# Patient Record
Sex: MALE | Race: WHITE | NOT HISPANIC OR LATINO | Employment: FULL TIME | URBAN - METROPOLITAN AREA
[De-identification: names, ages, dates, MRNs, and addresses within clinical notes are randomized per-mention and may not be internally consistent; named-entity substitution may affect disease eponyms.]

---

## 2023-05-10 PROBLEM — R10.9 ABDOMINAL WALL PAIN: Status: ACTIVE | Noted: 2023-05-10

## 2023-05-10 PROBLEM — D31.41 NEVUS OF IRIS OF RIGHT EYE: Status: ACTIVE | Noted: 2023-05-10

## 2023-05-10 PROBLEM — N40.0 BENIGN ENLARGEMENT OF PROSTATE: Status: ACTIVE | Noted: 2023-05-10

## 2023-05-10 PROBLEM — K21.00 GERD WITH ESOPHAGITIS: Status: ACTIVE | Noted: 2023-05-10

## 2023-05-10 PROBLEM — S83.232A COMPLEX TEAR OF MEDIAL MENISCUS OF LEFT KNEE AS CURRENT INJURY: Status: ACTIVE | Noted: 2023-05-10

## 2023-05-10 PROBLEM — L98.9 SKIN DISORDER: Status: ACTIVE | Noted: 2023-05-10

## 2023-05-10 PROBLEM — H47.239 LARGE PHYSIOLOGIC CUPPING OF OPTIC DISC: Status: ACTIVE | Noted: 2023-05-10

## 2023-05-10 PROBLEM — H02.88A MEIBOMIAN GLAND DYSFUNCTION (MGD), BILATERAL, BOTH UPPER AND LOWER LIDS: Status: ACTIVE | Noted: 2023-05-10

## 2023-05-10 PROBLEM — J01.90 ACUTE SINUSITIS: Status: ACTIVE | Noted: 2023-05-10

## 2023-05-10 PROBLEM — I10 ESSENTIAL HYPERTENSION: Status: ACTIVE | Noted: 2023-05-10

## 2023-05-10 PROBLEM — G56.20 ULNAR NEUROPATHY AT ELBOW: Status: ACTIVE | Noted: 2023-05-10

## 2023-05-10 PROBLEM — E78.5 DYSLIPIDEMIA: Status: ACTIVE | Noted: 2023-05-10

## 2023-05-10 PROBLEM — M25.569 KNEE PAIN: Status: ACTIVE | Noted: 2023-05-10

## 2023-05-10 PROBLEM — M62.830 LUMBAR PARASPINAL MUSCLE SPASM: Status: ACTIVE | Noted: 2023-05-10

## 2023-05-10 PROBLEM — G56.00 CARPAL TUNNEL SYNDROME: Status: ACTIVE | Noted: 2023-05-10

## 2023-05-10 PROBLEM — M76.52 PATELLAR TENDINITIS OF LEFT KNEE: Status: ACTIVE | Noted: 2023-05-10

## 2023-05-10 PROBLEM — J98.8 RESPIRATORY TRACT INFECTION DUE TO COVID-19 VIRUS: Status: ACTIVE | Noted: 2023-05-10

## 2023-05-10 PROBLEM — H04.301 DACROCYSTITIS, RIGHT: Status: ACTIVE | Noted: 2023-05-10

## 2023-05-10 PROBLEM — B02.9 HERPES ZOSTER: Status: ACTIVE | Noted: 2023-05-10

## 2023-05-10 PROBLEM — R31.29 MICROSCOPIC HEMATURIA: Status: ACTIVE | Noted: 2023-05-10

## 2023-05-10 PROBLEM — K44.9 HIATAL HERNIA: Status: ACTIVE | Noted: 2023-05-10

## 2023-05-10 PROBLEM — J30.81 ALLERGIC RHINITIS DUE TO CATS: Status: ACTIVE | Noted: 2023-05-10

## 2023-05-10 PROBLEM — N52.9 MALE ERECTILE DISORDER OF ORGANIC ORIGIN: Status: ACTIVE | Noted: 2023-05-10

## 2023-05-10 PROBLEM — R05.8 POST-VIRAL COUGH SYNDROME: Status: ACTIVE | Noted: 2023-05-10

## 2023-05-10 PROBLEM — N45.2 ORCHITIS, RIGHT: Status: ACTIVE | Noted: 2023-05-10

## 2023-05-10 PROBLEM — R73.03 PREDIABETES: Status: ACTIVE | Noted: 2023-05-10

## 2023-05-10 PROBLEM — R31.0 GROSS HEMATURIA: Status: ACTIVE | Noted: 2023-05-10

## 2023-05-10 PROBLEM — R35.0 URINARY FREQUENCY: Status: ACTIVE | Noted: 2023-05-10

## 2023-05-10 PROBLEM — M23.90 INTERNAL DERANGEMENT OF KNEE: Status: ACTIVE | Noted: 2023-05-10

## 2023-05-10 PROBLEM — J06.9 URI (UPPER RESPIRATORY INFECTION): Status: ACTIVE | Noted: 2023-05-10

## 2023-05-10 PROBLEM — R68.89 INFLUENZA-LIKE SYMPTOMS: Status: ACTIVE | Noted: 2023-05-10

## 2023-05-10 PROBLEM — U07.1 RESPIRATORY TRACT INFECTION DUE TO COVID-19 VIRUS: Status: ACTIVE | Noted: 2023-05-10

## 2023-05-10 PROBLEM — M72.2 PLANTAR FASCIITIS, RIGHT: Status: ACTIVE | Noted: 2023-05-10

## 2023-05-10 PROBLEM — R10.32 LEFT LOWER QUADRANT PAIN: Status: ACTIVE | Noted: 2023-05-10

## 2023-05-10 PROBLEM — J45.909 REACTIVE AIRWAY DISEASE (HHS-HCC): Status: ACTIVE | Noted: 2023-05-10

## 2023-05-10 PROBLEM — J30.89 ALLERGIC RHINITIS DUE TO HOUSE DUST MITE: Status: ACTIVE | Noted: 2023-05-10

## 2023-05-10 PROBLEM — J30.1 ALLERGIC RHINITIS DUE TO POLLEN: Status: ACTIVE | Noted: 2023-05-10

## 2023-05-10 PROBLEM — I86.1 RIGHT VARICOCELE: Status: ACTIVE | Noted: 2023-05-10

## 2023-05-10 PROBLEM — J30.9 ALLERGIC RHINITIS: Status: ACTIVE | Noted: 2023-05-10

## 2023-05-10 PROBLEM — H02.88B MEIBOMIAN GLAND DYSFUNCTION (MGD), BILATERAL, BOTH UPPER AND LOWER LIDS: Status: ACTIVE | Noted: 2023-05-10

## 2023-05-10 PROBLEM — E66.3 OVERWEIGHT: Status: ACTIVE | Noted: 2023-05-10

## 2023-05-10 PROBLEM — K21.9 ESOPHAGEAL REFLUX: Status: ACTIVE | Noted: 2023-05-10

## 2023-05-10 PROBLEM — K64.4 EXTERNAL HEMORRHOIDS: Status: ACTIVE | Noted: 2023-05-10

## 2023-05-10 PROBLEM — H93.13 TINNITUS OF BOTH EARS: Status: ACTIVE | Noted: 2023-05-10

## 2023-05-10 PROBLEM — S43.005A DISLOCATION OF SHOULDER REGION, LEFT, INITIAL ENCOUNTER: Status: ACTIVE | Noted: 2023-05-10

## 2023-05-10 PROBLEM — G47.33 OBSTRUCTIVE SLEEP APNEA ON CPAP: Status: ACTIVE | Noted: 2023-05-10

## 2023-05-10 PROBLEM — H04.129 DRY EYE SYNDROME: Status: ACTIVE | Noted: 2023-05-10

## 2023-05-10 PROBLEM — N45.1 EPIDIDYMITIS: Status: ACTIVE | Noted: 2023-05-10

## 2023-05-10 PROBLEM — R30.0 DYSURIA: Status: ACTIVE | Noted: 2023-05-10

## 2023-05-10 RX ORDER — POLYETHYLENE GLYCOL 3350 17 G/17G
POWDER, FOR SOLUTION ORAL
COMMUNITY
Start: 2014-09-24

## 2023-05-10 RX ORDER — DOXAZOSIN 4 MG/1
TABLET ORAL
COMMUNITY
End: 2023-07-07 | Stop reason: SDUPTHER

## 2023-05-10 RX ORDER — CHOLECALCIFEROL (VITAMIN D3) 25 MCG
1 TABLET ORAL DAILY
COMMUNITY
Start: 2018-02-14 | End: 2024-06-03 | Stop reason: DRUGHIGH

## 2023-05-10 RX ORDER — METHYLPREDNISOLONE 4 MG/1
TABLET ORAL
COMMUNITY
Start: 2022-03-24 | End: 2023-05-11 | Stop reason: ALTCHOICE

## 2023-05-10 RX ORDER — MULTIVITAMIN
1 TABLET ORAL DAILY
COMMUNITY
Start: 2018-02-14

## 2023-05-10 RX ORDER — SILDENAFIL 50 MG/1
TABLET, FILM COATED ORAL
COMMUNITY
Start: 2018-11-08

## 2023-05-10 RX ORDER — DOCUSATE SODIUM 100 MG/1
CAPSULE, LIQUID FILLED ORAL
COMMUNITY
Start: 2023-02-23 | End: 2023-05-11 | Stop reason: ALTCHOICE

## 2023-05-10 RX ORDER — NIRMATRELVIR AND RITONAVIR 300-100 MG
KIT ORAL
COMMUNITY
Start: 2022-05-04 | End: 2023-05-11 | Stop reason: ALTCHOICE

## 2023-05-10 RX ORDER — CIPROFLOXACIN 500 MG/1
1 TABLET ORAL EVERY 12 HOURS
COMMUNITY
Start: 2022-03-16 | End: 2023-05-11 | Stop reason: ALTCHOICE

## 2023-05-10 RX ORDER — WHEAT DEXTRIN 3 G/3.5 G
1 POWDER IN PACKET (EA) ORAL DAILY PRN
COMMUNITY
Start: 2014-09-12 | End: 2024-06-03 | Stop reason: ALTCHOICE

## 2023-05-10 RX ORDER — HYDROCODONE BITARTRATE AND ACETAMINOPHEN 5; 325 MG/1; MG/1
TABLET ORAL
COMMUNITY
Start: 2023-02-23 | End: 2023-05-11 | Stop reason: ALTCHOICE

## 2023-05-10 RX ORDER — MINERAL OIL
ENEMA (ML) RECTAL
COMMUNITY
Start: 2015-04-07

## 2023-05-10 RX ORDER — ASPIRIN 81 MG/1
TABLET ORAL
COMMUNITY
Start: 2023-02-23 | End: 2023-05-11 | Stop reason: ALTCHOICE

## 2023-05-10 RX ORDER — FLUTICASONE PROPIONATE 50 MCG
SPRAY, SUSPENSION (ML) NASAL
COMMUNITY
Start: 2015-02-26

## 2023-05-10 RX ORDER — ONDANSETRON 4 MG/1
TABLET, FILM COATED ORAL
COMMUNITY
Start: 2023-02-23 | End: 2023-05-11 | Stop reason: ALTCHOICE

## 2023-05-11 ENCOUNTER — OFFICE VISIT (OUTPATIENT)
Dept: PRIMARY CARE | Facility: CLINIC | Age: 63
End: 2023-05-11
Payer: COMMERCIAL

## 2023-05-11 VITALS
HEART RATE: 79 BPM | TEMPERATURE: 98 F | RESPIRATION RATE: 16 BRPM | DIASTOLIC BLOOD PRESSURE: 81 MMHG | SYSTOLIC BLOOD PRESSURE: 117 MMHG | BODY MASS INDEX: 37.49 KG/M2 | OXYGEN SATURATION: 96 % | WEIGHT: 268.8 LBS

## 2023-05-11 DIAGNOSIS — E78.5 DYSLIPIDEMIA, GOAL LDL BELOW 100: Chronic | ICD-10-CM

## 2023-05-11 DIAGNOSIS — R73.03 PREDIABETES: Chronic | ICD-10-CM

## 2023-05-11 DIAGNOSIS — N40.1 BENIGN PROSTATIC HYPERPLASIA WITH NOCTURIA: Chronic | ICD-10-CM

## 2023-05-11 DIAGNOSIS — K44.9 HIATAL HERNIA: Chronic | ICD-10-CM

## 2023-05-11 DIAGNOSIS — J30.1 SEASONAL ALLERGIC RHINITIS DUE TO POLLEN: Chronic | ICD-10-CM

## 2023-05-11 DIAGNOSIS — K21.00 GASTROESOPHAGEAL REFLUX DISEASE WITH ESOPHAGITIS WITHOUT HEMORRHAGE: Chronic | ICD-10-CM

## 2023-05-11 DIAGNOSIS — I10 ESSENTIAL HYPERTENSION: Chronic | ICD-10-CM

## 2023-05-11 DIAGNOSIS — E66.01 CLASS 2 SEVERE OBESITY WITH SERIOUS COMORBIDITY AND BODY MASS INDEX (BMI) OF 37.0 TO 37.9 IN ADULT, UNSPECIFIED OBESITY TYPE (MULTI): Chronic | ICD-10-CM

## 2023-05-11 DIAGNOSIS — G47.33 OBSTRUCTIVE SLEEP APNEA ON CPAP: Primary | Chronic | ICD-10-CM

## 2023-05-11 DIAGNOSIS — R35.1 BENIGN PROSTATIC HYPERPLASIA WITH NOCTURIA: Chronic | ICD-10-CM

## 2023-05-11 DIAGNOSIS — E55.9 VITAMIN D DEFICIENCY: Chronic | ICD-10-CM

## 2023-05-11 PROBLEM — J98.8 RESPIRATORY TRACT INFECTION DUE TO COVID-19 VIRUS: Status: RESOLVED | Noted: 2023-05-10 | Resolved: 2023-05-11

## 2023-05-11 PROBLEM — U07.1 RESPIRATORY TRACT INFECTION DUE TO COVID-19 VIRUS: Status: RESOLVED | Noted: 2023-05-10 | Resolved: 2023-05-11

## 2023-05-11 PROBLEM — R05.8 POST-VIRAL COUGH SYNDROME: Status: RESOLVED | Noted: 2023-05-10 | Resolved: 2023-05-11

## 2023-05-11 PROCEDURE — 3079F DIAST BP 80-89 MM HG: CPT | Performed by: INTERNAL MEDICINE

## 2023-05-11 PROCEDURE — 99214 OFFICE O/P EST MOD 30 MIN: CPT | Performed by: INTERNAL MEDICINE

## 2023-05-11 PROCEDURE — 3074F SYST BP LT 130 MM HG: CPT | Performed by: INTERNAL MEDICINE

## 2023-05-11 PROCEDURE — 3008F BODY MASS INDEX DOCD: CPT | Performed by: INTERNAL MEDICINE

## 2023-05-11 PROCEDURE — 1036F TOBACCO NON-USER: CPT | Performed by: INTERNAL MEDICINE

## 2023-05-11 ASSESSMENT — ENCOUNTER SYMPTOMS
OCCASIONAL FEELINGS OF UNSTEADINESS: 0
DEPRESSION: 0
LOSS OF SENSATION IN FEET: 0

## 2023-05-11 ASSESSMENT — PATIENT HEALTH QUESTIONNAIRE - PHQ9
1. LITTLE INTEREST OR PLEASURE IN DOING THINGS: NOT AT ALL
SUM OF ALL RESPONSES TO PHQ9 QUESTIONS 1 AND 2: 0
2. FEELING DOWN, DEPRESSED OR HOPELESS: NOT AT ALL

## 2023-05-11 NOTE — PROGRESS NOTES
Subjective   Patient ID: Marianne Huertas is a 63 y.o. male who presents for Follow-up.    Here for semiannual follow-up  For the most part doing well.  Denies illnesses or injuries  Left knee meniscal tear required arthroscopy late February.  Still some stiffness         Review of Systems    Objective   /81 (BP Location: Left arm, Patient Position: Sitting, BP Cuff Size: Adult)   Pulse 79   Temp 36.7 °C (98 °F)   Resp 16   Wt 122 kg (268 lb 12.8 oz)   SpO2 96%   BMI 37.49 kg/m²     Physical Exam  Vitals reviewed.   Constitutional:       Appearance: Normal appearance.   HENT:      Head: Normocephalic and atraumatic.   Eyes:      General: No scleral icterus.        Right eye: No discharge.         Left eye: No discharge.      Extraocular Movements: Extraocular movements intact.      Conjunctiva/sclera: Conjunctivae normal.      Pupils: Pupils are equal, round, and reactive to light.   Cardiovascular:      Rate and Rhythm: Normal rate and regular rhythm.      Pulses: Normal pulses.      Heart sounds: Normal heart sounds. No murmur heard.  Pulmonary:      Effort: Pulmonary effort is normal.      Breath sounds: Normal breath sounds. No wheezing or rhonchi.   Musculoskeletal:         General: No deformity or signs of injury. Normal range of motion.      Cervical back: Normal range of motion and neck supple. No rigidity or tenderness.   Lymphadenopathy:      Cervical: No cervical adenopathy.   Skin:     General: Skin is warm and dry.      Findings: No rash.   Neurological:      General: No focal deficit present.      Mental Status: He is alert and oriented to person, place, and time. Mental status is at baseline.      Cranial Nerves: No cranial nerve deficit.      Sensory: No sensory deficit.      Gait: Gait normal.   Psychiatric:         Mood and Affect: Mood normal.         Behavior: Behavior normal.         Thought Content: Thought content normal.         Judgment: Judgment normal.          Assessment/Plan   Problem List Items Addressed This Visit          Nervous    Obstructive sleep apnea on CPAP - Primary       Respiratory    Hiatal hernia       Circulatory    Essential hypertension    Relevant Orders    Basic Metabolic Panel    Follow Up In [facility] - PCP       Digestive    GERD with esophagitis       Genitourinary    Benign enlargement of prostate    Relevant Orders    Prostate Specific Antigen       Endocrine/Metabolic    Vitamin D deficiency (Chronic)    Relevant Orders    Vitamin D, Total    Class 2 severe obesity with serious comorbidity and body mass index (BMI) of 37.0 to 37.9 in adult (CMS/[facility], [facility]) (Chronic)    Prediabetes    Relevant Orders    Hemoglobin A1C       Other    Allergic rhinitis    Dyslipidemia, goal LDL below 100    Relevant Orders    TSH with reflex to Free T4 if abnormal    Lipid Panel    Alanine Aminotransferase          Left knee meniscal tear 10/22-requiring arthroscopy with Dr. Tello late February 2023.  Physical therapy has completed.  He still doing the exercises.  A bit stiff still.  He will advance activity and follow-up with orthopedics as scheduled.    10-year cardiac risk September 20 21- 6.4%-he understands he will likely need a statin at some point over the next several years. Fortunately calcium score = zero March 2018 We will continue to reassess annually. Weight loss would be in his best interest as well as exercise consistently     History of Atypical chest pain syndrome- September 2018- this came on when he was reaching with his left arm and resolve spontaneously after 5 hours. Evaluation in the emergency room showed no pulmonary embolus did have some pleuritic component with the pain worsening with deep inspiration. Cardiac enzymes negative. Presently he has no musculoskeletal or respiratory symptoms. Will defer further evaluation presently. No recurrence     Hypertension- blood pressure improved. He will continue weight loss efforts and  doxazosin. Blood pressure improved on recheck     Elevated weight with BMI over 30 / dyslipidemia- in the past, discussed swimming and weight loss efforts in the past. In the past, encouraged to follow up with a dietician when he is back in town to see what foods he could eat.      Exercise routine - before knee injury elliptical at home-3 times weekly-he also walks the dogs daily. Suggested he try to advance his elliptical intensity     Prediabetes- reviewed his A1c trend since 2014. Recently improved- A1c 5.9% March 2022. Reviewed information sheet on A1c, and the relation to impaired glucose metabolism. Home glucometer suggested-check his glucose at times. He will continue long-term weight loss efforts and will follow the A1c semiannually     Right lower abdominal wall discomfort-occasionally recurrent-not recently problematic     Remote right inguinal hernia repair-as an infant-no obvious hernia on past recheck.     Acid reflux/dyspepsia/hiatal hernia- EGD updated 2014- esophagitis- Prevacid helped-he is off of this presently. Certain foods cause abdominal symptoms and he tends to avoid these. Less symptoms when he does not eat is late in the evening. No swallowing discomfort encouraged him to travel with this and use this with any recurrent abdominal acid symptoms. Pepcid used as needed as well.     IBS- he'll has pain just left of the umbilical area, evaluated in GI by Dr. Espinoza in the past. long-standing abdominal bloating with certain foods Now Pepcid as needed for breakthrough symptoms in the evening. Benefiber used consistently & MiraLAX daily. In the past suggested probiotic daily.. Overall with better diet and weight loss, using his antacid far less frequently, typically just before spicy meals   With constipation he tends to have left mid abdominal discomfort which has happened recently. Encouraged ample fluid and fiber to avoid constipation- which he has been doing consistently   He will continue  colonoscopy surveillance per Dr. Espinoza-every 10 years. Updated September 2014. Next in September 2024.     Perennial allergic rhinitis/reactive airway disease/recent cough-he will continue fluticasone and Allegra daily-especially with spring pollen anticipated   Discussed upcoming spring season-he will have his medicines on hand as needed     Sleep apnea- CPAP nightly continues. no issues. He has one he travels w/ unit too     Urology care with past right orchitis / Hx epididymitis/gross hematuria/BPH/ED/urology care/family history of bladder cancer-father-recent cystoscopy and CT urogram per Dr. Shah. His PSA has increased from last year and so he will get back with Dr. Shah to review this. He's not yet seen Dr. Shah. PSA normal March 2020   Recurrent symptoms have flared-with testicular pain and dysuria. He has seen Dr. Shah, and also the urology NP in Apr '22. Improved. Awaiting annual PSA     History of umbilical hernia repair-Asymptomatic     Left knee pain - neg. ortho eval incl. x-rays per Dr. Graham; PT suggested. Mild arthritis on x-ray. There may be a meniscal injury as well. Fortunately improved presently.     Bilateral knee pain- occasional and alternating. No effusion. S/p downhill walk while in [place]. Not problematic at this time. He will call with concerns.     History of left shoulder dislocation-he saw Dr. Chichendance; surgery not needed. Asymptomatic presently.      Bilateral plantar fasciitis- occas an issue. insoles helpful     Remote left elbow ulnar transposition surgery/bilateral carpal tunnel symptoms-although an EMG did not necessarily show carpal tunnel he states, he uses wrist splints most nights which help hand tingling during the daytime. Encouraged optimal position including keeping his elbow straight at night as he has mainly fourth and fifth finger tingling symptoms more suggestive of ulnar issues        Skin care -family history of melanoma-father--he will continue to see  Dr. Martins annually; no concerns     Dental care-encouraged semiannual dental visits. Next appt in 12/22.     Dry eyes / Vision care-vision visits encouraged at least biannually. updated 2017. Reading glasses sufficient. May need an update to his spectacles for distance.         Flu shot each fall-updated mid-October 2022     Covid series completed. Three booster shots completed. Last shot on 10/31/22.     Shingrix- updated 12/19.     Follow-up semiannually or sooner as needed     Charting was completed using voice recognition technology and may include unintended errors.

## 2023-05-12 PROBLEM — E66.812 CLASS 2 SEVERE OBESITY WITH SERIOUS COMORBIDITY AND BODY MASS INDEX (BMI) OF 37.0 TO 37.9 IN ADULT: Chronic | Status: ACTIVE | Noted: 2023-05-12

## 2023-05-12 PROBLEM — E55.9 VITAMIN D DEFICIENCY: Chronic | Status: ACTIVE | Noted: 2023-05-12

## 2023-05-12 PROBLEM — E66.01 CLASS 2 SEVERE OBESITY WITH SERIOUS COMORBIDITY AND BODY MASS INDEX (BMI) OF 37.0 TO 37.9 IN ADULT (MULTI): Chronic | Status: ACTIVE | Noted: 2023-05-12

## 2023-07-07 DIAGNOSIS — N40.1 BENIGN PROSTATIC HYPERPLASIA WITH NOCTURIA: Primary | ICD-10-CM

## 2023-07-07 DIAGNOSIS — I10 ESSENTIAL HYPERTENSION: ICD-10-CM

## 2023-07-07 DIAGNOSIS — R35.1 BENIGN PROSTATIC HYPERPLASIA WITH NOCTURIA: Primary | ICD-10-CM

## 2023-07-07 RX ORDER — DOXAZOSIN 4 MG/1
4 TABLET ORAL NIGHTLY
Qty: 90 TABLET | Refills: 3 | Status: SHIPPED | OUTPATIENT
Start: 2023-07-07 | End: 2024-07-06

## 2023-10-05 ENCOUNTER — EVALUATION (OUTPATIENT)
Dept: PHYSICAL THERAPY | Facility: CLINIC | Age: 63
End: 2023-10-05
Payer: COMMERCIAL

## 2023-10-05 DIAGNOSIS — M25.562 CHRONIC PAIN OF LEFT KNEE: Primary | ICD-10-CM

## 2023-10-05 DIAGNOSIS — G89.29 CHRONIC PAIN OF LEFT KNEE: Primary | ICD-10-CM

## 2023-10-05 PROCEDURE — 97535 SELF CARE MNGMENT TRAINING: CPT | Mod: GP

## 2023-10-05 PROCEDURE — 97161 PT EVAL LOW COMPLEX 20 MIN: CPT | Mod: GP

## 2023-10-05 PROCEDURE — 97110 THERAPEUTIC EXERCISES: CPT | Mod: GP

## 2023-10-05 NOTE — Clinical Note
October 5, 2023     Patient: Marianne Huertas   YOB: 1960   Date of Visit: 10/5/2023       To Whom It May Concern:    It is my medical opinion that Marianne Huertas {Work release (duty restriction):07202}.    If you have any questions or concerns, please don't hesitate to call.         Sincerely,        Mukesh Blankenship, PT    CC: No Recipients

## 2023-10-05 NOTE — Clinical Note
October 5, 2023     Patient: Marianne Huertas   YOB: 1960   Date of Visit: 10/5/2023       To Whom it May Concern:    Marianne Huertas was seen in my clinic on 10/5/2023. He {Return to school/sport:12768}.    If you have any questions or concerns, please don't hesitate to call.         Sincerely,          Mukesh Blankenship, PT        CC: No Recipients

## 2023-10-05 NOTE — PROGRESS NOTES
Physical Therapy    Physical Therapy Evaluation and Treatment      Patient Name: Marianne Huertsa  MRN: [id number]  Today's Date: 10/5/2023  Time Calculation  Start Time: 1305  Stop Time: 1347  Time Calculation (min): 42 min      Insurance:  Visit number:  1  0% COINS,7000 DED,20 VS/YR (4 USED)     Assessment:  Pt. presented with L pain which was likely d/t pes anserinus tendonitis resulting from previous menisectomy, altered body/joint mechanics, decreased mm. strength/endurance, & abnormal neuromotor control.  Pt. is likely to benefit from skilled interventions to address their impairments, & treatment to emphasize manual techniques to decrease pain & improve joint/soft-tissue mobility, balance & gait training exercises to improve safety & functional capacity, & LE exercises to increase strength, endurance, & neuromotor control.  However, pt will attempt to manage symptoms IND x 4-6 weeks & will only f/u in the clinic prn.    Standardized testing and measures administered today reveal that the patient has multiple impairments in body structures and functions, activity limitations, and participation restrictions.  The patient has 2 personal factors and comorbidities that may serve as barriers affecting the plan of care, including large habitus, previous menisectomy.   Skilled PT services are warranted in order to realize measurable change in the above outcome measures and achieve improvements in the patient's functional status and individual goals.      Plan:  Pt to attempt IND management x 4-6 weeks & will only f/u in the clinic prn.      Current Problem:   1. Chronic pain of left knee            Subjective    Pt. is well known to me from previous PT treatment & returns today with c/o continued L knee pain.  Patient states symptoms began 2/23/23 when he had a medial menisectomy.  Pt states the strength is good & the instability is much better - it still 'jagdish' 1x/wk, but that's improved from several  times/day.    Pt had a f/u Dr. Tello who offered a gel injection, but he's not sure if he should be doing more exercises or any other treatment    Pain Intensity:  0/10 currently at rest, (10 = worst imaginable pain), 4/10 at worst.    Description:  achy  Duration:  intermittent  Status:  progressively improving     Reports symptoms are aggravated with prolonged walking, increased activity, stairs, and alleviated with rest.        Objective   Observations:  visual inspection of the L knee was unremarkable    Palpatory Exam:  pt. c/o their 'familiar' pain along the L pes anserinus tendons with mod hypertonicity noted.    Hip ROM:  B = grossly pain free + WNL    Knee ROM_____ ___R [deg]___ ___L [deg]___    Flex____________ ___127_____ ___126_____   Ext_____________ ___0_____ ___0_____    * = painful          Hip MMT______ ___R______ ___L_____   Flex____________ ___5/5____ ___5/5___   ADD___________ ___5/5____ ___4/5*___   ABD___________  ___5/5____ ___5/5___   Ext_____________ ___5/5____ ___5/5___   * = painful          Knee MMT_____ ___R______ ___L_____   Flex____________ ___5/5____ ___5/5___   Ext_____________ ___5/5____ ___5/5___   * = painful          Knee Tests_________ ___R_____ ___L_____    Valgus at 0 deg____ ___[-]_____ ___[-]_____   Valgus at 30 deg___ ___[-]_____ ___[-]_____   Varus at 0 deg_____ ___[-]_____ ___[-]_____   Varus at 30 deg____ ___[-]_____ ___[-]_____   Jt. Line Tenderness_ ___[-]_____ ___[-]_____   Lynsey's________ ___[-]_____ ___[-]_____   Apley's___________ ___[-]_____ ___[-]_____         Treatment today included:    PT Evaluation (53269):  17 minutes      Therapeutic Exercises (00316):  15 minutes    Access Code: [email or web address]  URL: [email or web address]  Date: 10/05/2023  Prepared by: Mukesh Blankenship    Exercises  - Standing Repeated Hip Adduction with Resistance  - 4-5 x weekly - 2-3 sets - 10-15 reps  - Hip and Knee Flexion with Anchored Resistance  - 4-5 x weekly - 2-3  sets - 10-15 reps  - Walking / Pool / Cardio    * = added to HEP.  Sheet with exercise descriptions and images issued.  Skilled intervention utilized in the appropriate selection & application of above exercises.  Verbal and tactile cues provided for proper form and technique.  Pt. demonstrated appropriate form & verbalized understanding of optimal technique for above exercises.        Self Care / Home Management (63770):  10 minutes  The patient was educated on:  the importance of positioning, proper posture, and body mechanics, joint mechanics and pathology, general tissue healing time, the appropriate use of heat and cold to control pain and inflammation, the importance of general therapeutic exercise, especially to stay within pain-free ROM, specific anatomy, function, & regional interdependence of involved areas, & likely cause of impairments & POC.  Pt's questions were answered to their satisfaction, & pt. verbalized understanding & agreement with POC.      Goals:  Demonstrate independence with home exercise program  Increase L knee strength to pain free + WNL  Report decrease in pain by greater than or equal to 2 points to meet the MCID  Perform ADLs without an increase symptoms  Ascend / descend stairs without an increase in symptoms  Ambulate x 30 min without an increase in symptoms  Pt. will be able to sleep through the night without an increase in symptoms

## 2023-11-13 ENCOUNTER — LAB (OUTPATIENT)
Dept: LAB | Facility: LAB | Age: 63
End: 2023-11-13
Payer: COMMERCIAL

## 2023-11-13 DIAGNOSIS — R73.03 PREDIABETES: Chronic | ICD-10-CM

## 2023-11-13 DIAGNOSIS — E55.9 VITAMIN D DEFICIENCY: Chronic | ICD-10-CM

## 2023-11-13 DIAGNOSIS — N40.1 BENIGN PROSTATIC HYPERPLASIA WITH NOCTURIA: Chronic | ICD-10-CM

## 2023-11-13 DIAGNOSIS — I10 ESSENTIAL HYPERTENSION: Chronic | ICD-10-CM

## 2023-11-13 DIAGNOSIS — R35.1 BENIGN PROSTATIC HYPERPLASIA WITH NOCTURIA: Chronic | ICD-10-CM

## 2023-11-13 DIAGNOSIS — E78.5 DYSLIPIDEMIA, GOAL LDL BELOW 100: Chronic | ICD-10-CM

## 2023-11-13 LAB
25(OH)D3 SERPL-MCNC: 23 NG/ML (ref 30–100)
ALT SERPL W P-5'-P-CCNC: 21 U/L (ref 10–52)
ANION GAP SERPL CALC-SCNC: 11 MMOL/L (ref 10–20)
BUN SERPL-MCNC: 22 MG/DL (ref 6–23)
CALCIUM SERPL-MCNC: 9.2 MG/DL (ref 8.6–10.3)
CHLORIDE SERPL-SCNC: 104 MMOL/L (ref 98–107)
CHOLEST SERPL-MCNC: 184 MG/DL (ref 0–199)
CHOLESTEROL/HDL RATIO: 3
CO2 SERPL-SCNC: 25 MMOL/L (ref 21–32)
CREAT SERPL-MCNC: 1.01 MG/DL (ref 0.5–1.3)
EST. AVERAGE GLUCOSE BLD GHB EST-MCNC: 126 MG/DL
GFR SERPL CREATININE-BSD FRML MDRD: 85 ML/MIN/1.73M*2
GLUCOSE SERPL-MCNC: 92 MG/DL (ref 74–99)
HBA1C MFR BLD: 6 %
HDLC SERPL-MCNC: 62 MG/DL
LDLC SERPL CALC-MCNC: 95 MG/DL
NON HDL CHOLESTEROL: 122 MG/DL (ref 0–149)
POTASSIUM SERPL-SCNC: 3.9 MMOL/L (ref 3.5–5.3)
PSA SERPL-MCNC: 2.73 NG/ML
SODIUM SERPL-SCNC: 136 MMOL/L (ref 136–145)
TRIGL SERPL-MCNC: 137 MG/DL (ref 0–149)
TSH SERPL-ACNC: 2.12 MIU/L (ref 0.44–3.98)
VLDL: 27 MG/DL (ref 0–40)

## 2023-11-13 PROCEDURE — 82306 VITAMIN D 25 HYDROXY: CPT

## 2023-11-13 PROCEDURE — 36415 COLL VENOUS BLD VENIPUNCTURE: CPT

## 2023-11-13 PROCEDURE — 80048 BASIC METABOLIC PNL TOTAL CA: CPT

## 2023-11-13 PROCEDURE — 83036 HEMOGLOBIN GLYCOSYLATED A1C: CPT

## 2023-11-13 PROCEDURE — 80061 LIPID PANEL: CPT

## 2023-11-13 PROCEDURE — 84443 ASSAY THYROID STIM HORMONE: CPT

## 2023-11-13 PROCEDURE — 84153 ASSAY OF PSA TOTAL: CPT

## 2023-11-13 PROCEDURE — 84460 ALANINE AMINO (ALT) (SGPT): CPT

## 2023-11-14 ENCOUNTER — OFFICE VISIT (OUTPATIENT)
Dept: PRIMARY CARE | Facility: CLINIC | Age: 63
End: 2023-11-14
Payer: COMMERCIAL

## 2023-11-14 VITALS
BODY MASS INDEX: 39.51 KG/M2 | SYSTOLIC BLOOD PRESSURE: 130 MMHG | DIASTOLIC BLOOD PRESSURE: 88 MMHG | HEIGHT: 70 IN | HEART RATE: 70 BPM | TEMPERATURE: 97.3 F | WEIGHT: 276 LBS | OXYGEN SATURATION: 97 % | RESPIRATION RATE: 16 BRPM

## 2023-11-14 DIAGNOSIS — E55.9 VITAMIN D DEFICIENCY: Chronic | ICD-10-CM

## 2023-11-14 DIAGNOSIS — Z12.11 COLON CANCER SCREENING: Primary | Chronic | ICD-10-CM

## 2023-11-14 DIAGNOSIS — E66.01 CLASS 2 SEVERE OBESITY WITH SERIOUS COMORBIDITY AND BODY MASS INDEX (BMI) OF 39.0 TO 39.9 IN ADULT, UNSPECIFIED OBESITY TYPE (MULTI): Chronic | ICD-10-CM

## 2023-11-14 DIAGNOSIS — N45.1 EPIDIDYMITIS: Chronic | ICD-10-CM

## 2023-11-14 DIAGNOSIS — R73.03 PREDIABETES: Chronic | ICD-10-CM

## 2023-11-14 DIAGNOSIS — I10 ESSENTIAL HYPERTENSION: Chronic | ICD-10-CM

## 2023-11-14 PROCEDURE — 3075F SYST BP GE 130 - 139MM HG: CPT | Performed by: INTERNAL MEDICINE

## 2023-11-14 PROCEDURE — 1036F TOBACCO NON-USER: CPT | Performed by: INTERNAL MEDICINE

## 2023-11-14 PROCEDURE — 99396 PREV VISIT EST AGE 40-64: CPT | Performed by: INTERNAL MEDICINE

## 2023-11-14 PROCEDURE — 3079F DIAST BP 80-89 MM HG: CPT | Performed by: INTERNAL MEDICINE

## 2023-11-14 PROCEDURE — 93000 ELECTROCARDIOGRAM COMPLETE: CPT | Performed by: INTERNAL MEDICINE

## 2023-11-14 PROCEDURE — 3008F BODY MASS INDEX DOCD: CPT | Performed by: INTERNAL MEDICINE

## 2023-11-14 RX ORDER — LEVOFLOXACIN 500 MG/1
500 TABLET, FILM COATED ORAL DAILY
Qty: 3 TABLET | Refills: 0 | Status: SHIPPED | OUTPATIENT
Start: 2023-11-14 | End: 2023-11-17

## 2023-11-14 RX ORDER — PANTOPRAZOLE SODIUM 40 MG/1
40 TABLET, DELAYED RELEASE ORAL DAILY PRN
COMMUNITY
Start: 2015-12-14

## 2023-11-14 ASSESSMENT — ENCOUNTER SYMPTOMS
DEPRESSION: 0
OCCASIONAL FEELINGS OF UNSTEADINESS: 0
LOSS OF SENSATION IN FEET: 0

## 2023-11-14 ASSESSMENT — PATIENT HEALTH QUESTIONNAIRE - PHQ9
SUM OF ALL RESPONSES TO PHQ9 QUESTIONS 1 AND 2: 0
2. FEELING DOWN, DEPRESSED OR HOPELESS: NOT AT ALL
1. LITTLE INTEREST OR PLEASURE IN DOING THINGS: NOT AT ALL

## 2023-11-14 NOTE — PROGRESS NOTES
"Subjective   Patient ID: Marianne Huertas is a 63 y.o. male who presents for Annual Exam.    Here for follow-up and wellness visit.  He was recently in urgent care for left testicular pain diagnosed as epididymitis.  7 days of levofloxacin 500 mg daily helped though he has some residual symptoms.  He is coincidentally scheduled to see his urologist Dr. Shah in 3 days this Friday    No exertional chest pain, palpitations, dizziness, orthopnea or pedal edema.    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    No recent illnesses         Review of Systems    Objective   /88   Pulse 70   Temp 36.3 °C (97.3 °F)   Resp 16   Ht 1.778 m (5' 10\")   Wt 125 kg (276 lb)   SpO2 97%   BMI 39.60 kg/m²     Physical Exam  Constitutional:       Appearance: Normal appearance.   HENT:      Head: Normocephalic and atraumatic.      Right Ear: Tympanic membrane normal.      Left Ear: Tympanic membrane normal.      Nose: Nose normal.   Eyes:      General: No scleral icterus.     Extraocular Movements: Extraocular movements intact.      Conjunctiva/sclera: Conjunctivae normal.      Pupils: Pupils are equal, round, and reactive to light.   Cardiovascular:      Rate and Rhythm: Normal rate and regular rhythm.      Pulses: Normal pulses.      Heart sounds: Normal heart sounds. No murmur heard.  Pulmonary:      Effort: Pulmonary effort is normal. No respiratory distress.      Breath sounds: Normal breath sounds. No stridor. No wheezing.   Abdominal:      General: Abdomen is flat. Bowel sounds are normal. There is no distension.      Palpations: Abdomen is soft. There is no mass.      Tenderness: There is no abdominal tenderness. There is no guarding.   Musculoskeletal:         General: No swelling, tenderness or deformity. Normal range of motion.      Cervical back: Normal range of motion and neck supple. No tenderness.   Lymphadenopathy:      Cervical: No cervical adenopathy.   Skin:     " General: Skin is warm and dry.      Findings: No lesion or rash.   Neurological:      General: No focal deficit present.      Mental Status: He is alert and oriented to person, place, and time.      Cranial Nerves: No cranial nerve deficit.      Motor: No weakness.   Psychiatric:         Mood and Affect: Mood normal.         Behavior: Behavior normal.         Thought Content: Thought content normal.         Judgment: Judgment normal.         Assessment/Plan   Problem List Items Addressed This Visit             ICD-10-CM    Epididymitis N45.1    Relevant Medications    levoFLOXacin (Levaquin) 500 mg tablet    Essential hypertension I10    Relevant Orders    ECG 12 lead (Clinic Performed) (Completed)    Prediabetes R73.03    Relevant Orders    Hemoglobin A1C    Basic Metabolic Panel    Vitamin D deficiency (Chronic) E55.9    Relevant Orders    Vitamin D 25-Hydroxy,Total (for eval of Vitamin D levels)    Class 2 severe obesity with serious comorbidity and body mass index (BMI) of 39.0 to 39.9 in adult (CMS/[facility]) E66.01, Z68.39    Colon cancer screening - Primary (Chronic) Z12.11    Relevant Orders    Colonoscopy Screening; Average Risk Patient        Left knee meniscal tear 10/22-requiring arthroscopy with Dr. Tello late February 2023.  Physical therapy has completed.  He still doing the exercises.  A bit stiff still.  He will advance activity and follow-up with orthopedics as scheduled.             He did [place] back with physical therapy 10/23.  He will continue stretching though he notes is not quite right.  He may consider a gel injection he notes at some point soon    10-year cardiac risk September 20 21- 6.4%-he understands he will likely need a statin at some point over the next several years. Fortunately calcium score = zero March 2018 We will continue to reassess annually. Weight loss would be in his best interest as well as exercise consistently     History of Atypical chest pain syndrome- September 2018- this  came on when he was reaching with his left arm and resolve spontaneously after 5 hours. Evaluation in the emergency room showed no pulmonary embolus did have some pleuritic component with the pain worsening with deep inspiration. Cardiac enzymes negative. Presently he has no musculoskeletal or respiratory symptoms. Will defer further evaluation presently. No recurrence     Hypertension- blood pressure improved. He will continue weight loss efforts and doxazosin. Blood pressure improved on recheck     Elevated weight with BMI over 30 / dyslipidemia- in the past, discussed swimming and weight loss efforts in the past. In the past, encouraged to follow up with a dietician when he is back in town to see what foods he could eat.                11/23-BMI 39.6.  He will continue his diet and exercise efforts     Exercise routine - before knee injury elliptical at home-3 times weekly-he also walks the dogs daily. Suggested he try to advance his elliptical intensity          He plans to resume his shorter walks and elliptical workouts this winter     Prediabetes- reviewed his A1c trend since 2014. Recently improved- A1c 5.9% March 2022. Reviewed information sheet on A1c, and the relation to impaired glucose metabolism. Home glucometer suggested-check his glucose at times. He will continue long-term weight loss efforts and will follow the A1c semiannually             11/23-A1c stable at 6%.  We will follow semiannually     Right lower abdominal wall discomfort-occasionally recurrent-not recently problematic     Remote right inguinal hernia repair-as an infant-no obvious hernia on past recheck.     Acid reflux/dyspepsia/hiatal hernia- EGD updated 2014- esophagitis- Prevacid helped-he is off of this presently. Certain foods cause abdominal symptoms and he tends to avoid these. Less symptoms when he does not eat is late in the evening. No swallowing discomfort encouraged him to travel with this and use this with any recurrent  abdominal acid symptoms. Pepcid used as needed as well.     IBS- he'll has pain just left of the umbilical area, evaluated in GI by Dr. Espinoza in the past. long-standing abdominal bloating with certain foods Now Pepcid as needed for breakthrough symptoms in the evening. Benefiber used consistently & MiraLAX daily. In the past suggested probiotic daily.. Overall with better diet and weight loss, using his antacid far less frequently, typically just before spicy meals   With constipation he tends to have left mid abdominal discomfort which has happened recently. Encouraged ample fluid and fiber to avoid constipation- which he has been doing consistently   He will continue colonoscopy surveillance per Dr. Espinoza-every 10 years. Updated September 2014. Next in September 2024.                          11/23-colonoscopy ordered       Perennial allergic rhinitis/reactive airway disease/recent cough-he will continue fluticasone and Allegra daily-especially with spring pollen anticipated   Discussed upcoming spring season-he will have his medicines on hand as needed     Sleep apnea- CPAP nightly continues. no issues. He has one he travels w/ unit too     Urology care with past right orchitis / Hx epididymitis/gross hematuria/BPH/ED/urology care/family history of bladder cancer-father-recent cystoscopy and CT urogram per Dr. Shah.             11/23-PSA stable.  He recently had a bout of left testicular pain diagnosed as epididymitis in urgent care.  He received 7 days of Levaquin-still some residual symptoms.  He will take 3 more days of Levaquin and discussed with Dr. Shah who he coincidentally sees later this week.       History of umbilical hernia repair-Asymptomatic     Left knee pain - neg. ortho eval incl. x-rays per Dr. Graham; PT suggested. Mild arthritis on x-ray. There may be a meniscal injury as well. Fortunately improved presently.     Bilateral knee pain- occasional and alternating. No effusion. S/p downhill  walk while in [place]. Not problematic at this time. He will call with concerns.     History of left shoulder dislocation-he saw Dr. Chichendance; surgery not needed. Asymptomatic presently.      Bilateral plantar fasciitis- occas an issue. insoles helpful     Remote left elbow ulnar transposition surgery/bilateral carpal tunnel symptoms-although an EMG did not necessarily show carpal tunnel he states, he uses wrist splints most nights which help hand tingling during the daytime. Encouraged optimal position including keeping his elbow straight at night as he has mainly fourth and fifth finger tingling symptoms more suggestive of ulnar issues        Skin care -family history of melanoma-father--he will continue to see Dr. Martins annually; no concerns     Dental care-encouraged semiannual dental visits. Next appt in 12/22.     Dry eyes / Vision care-vision visits encouraged at least biannually. updated 2017. Reading glasses sufficient. May need an update to his spectacles for distance.         Flu shot each fall-updated mid-October 2022     Covid series completed. Three booster shots completed. Last shot on 10/31/22.     Shingrix- updated 12/19.     Follow-up semiannually or sooner as needed     Charting was completed using voice recognition technology and may include unintended errors.

## 2023-11-15 PROBLEM — E66.01 CLASS 2 SEVERE OBESITY WITH SERIOUS COMORBIDITY AND BODY MASS INDEX (BMI) OF 39.0 TO 39.9 IN ADULT (MULTI): Status: ACTIVE | Noted: 2023-05-12

## 2023-11-15 PROBLEM — Z12.11 COLON CANCER SCREENING: Chronic | Status: ACTIVE | Noted: 2023-11-15

## 2023-11-15 PROBLEM — E66.812 CLASS 2 SEVERE OBESITY WITH SERIOUS COMORBIDITY AND BODY MASS INDEX (BMI) OF 39.0 TO 39.9 IN ADULT: Status: ACTIVE | Noted: 2023-05-12

## 2023-11-15 NOTE — PROGRESS NOTES
FUV    Last seen - 3/24/22     HISTORY OF PRESENT ILLNESS:   Marianne Huertas is a 63 y.o. male who is being seen today for follow up and testicular discomfort.    PAST MEDICAL HISTORY:  Past Medical History:   Diagnosis Date    Body mass index (BMI) 36.0-36.9, adult 09/15/2018    BMI 36.0-36.9,adult    Chest pain, unspecified 11/09/2018    Chest pain syndrome    Cough, unspecified 01/10/2014    Cough    Elevated blood-pressure reading, without diagnosis of hypertension 07/21/2015    Elevated blood pressure reading without diagnosis of hypertension    Fracture of left shoulder girdle, part unspecified, initial encounter for closed fracture 02/28/2015    Fracture of bone of shoulder, left, closed, initial encounter    Male erectile dysfunction, unspecified 11/08/2018    Male erectile disorder of organic origin    Obstructive sleep apnea (adult) (pediatric) 03/16/2019    Obstructive sleep apnea on CPAP    Other abnormal glucose 09/27/2021    Hemoglobin A1c above reference range    Other general symptoms and signs 03/21/2016    Flu-like symptoms    Pain in left shoulder 12/01/2014    Left shoulder pain    Pain in right shoulder 12/01/2014    Right shoulder pain    Personal history of other diseases of male genital organs 03/31/2022    History of epididymitis    Personal history of other diseases of the digestive system 07/21/2015    History of constipation    Personal history of other diseases of the digestive system 10/21/2015    History of umbilical hernia    Personal history of other diseases of the nervous system and sense organs 02/28/2015    History of carpal tunnel syndrome    Personal history of other diseases of the nervous system and sense organs 08/19/2014    History of otitis media    Personal history of other diseases of the nervous system and sense organs 03/16/2019    History of sleep apnea    Personal history of other diseases of the respiratory system 09/13/2014    Personal history of asthma     Personal history of other diseases of the respiratory system 03/21/2016    History of acute sinusitis    Personal history of other endocrine, nutritional and metabolic disease 01/18/2018    History of obesity    Personal history of other specified conditions 07/30/2020    History of gross hematuria    Personal history of other specified conditions 02/16/2018    History of snoring    Personal history of other specified conditions 01/09/2014    History of headache    Umbilical hernia with obstruction, without gangrene 08/19/2015    Incarcerated umbilical hernia    Unspecified abdominal pain 07/21/2015    Abdominal cramping    Unspecified abdominal pain 09/27/2021    Abdominal wall pain    Unspecified abdominal pain 09/27/2021    Abdominal wall pain    Unspecified dacryocystitis of right lacrimal passage 02/13/2016    Dacrocystitis, right   - Renal cysts   - Epididymitis  - Gross hematuria with negative workup 03/19   - BPH   - ED     PAST SURGICAL HISTORY:  Past Surgical History:   Procedure Laterality Date    COLONOSCOPY  12/13/2013    Complete Colonoscopy    CYSTOSCOPY  12/13/2013    Diagnostic Cystoscopy    ESOPHAGOGASTRODUODENOSCOPY  05/23/2017    Diagnostic Esophagogastroduodenoscopy    KNEE ARTHROSCOPY W/ MENISCAL REPAIR      OTHER SURGICAL HISTORY  12/13/2013    Neuroplasty With Transposition Of Ulnar Nerve - At Elbow    OTHER SURGICAL HISTORY  05/24/2021    Hernia Repair Inguinal Unilateral    UMBILICAL HERNIA REPAIR  11/17/2015    Umbilical Hernia Repair        ALLERGIES:   Allergies   Allergen Reactions    Cat Dander Unknown    House Dust Mite Other        MEDICATIONS:   Current Outpatient Medications   Medication Instructions    Bacillus coagulans (PROBIOTIC, B. COAGULANS, ORAL) 1 capsule, oral, Daily    cholecalciferol (Vitamin D-3) 25 MCG (1000 UT) tablet 1 tablet, oral, Daily    doxazosin (CARDURA) 4 mg, oral, Nightly    fexofenadine (Allegra) 180 mg tablet oral    fluticasone (Flonase) 50 mcg/actuation  "nasal spray nasal    levoFLOXacin (LEVAQUIN) 500 mg, oral, Daily    multivitamin tablet 1 tablet, oral, Daily    pantoprazole (ProtoNix) 40 mg EC tablet     polyethylene glycol (Glycolax) 17 gram/dose powder oral    sildenafil (Viagra) 50 mg tablet oral    wheat dextrin (Benefiber Clear SF, dextrin,) 3 gram/3.5 gram powder in packet oral        PHYSICAL EXAM:  There were no vitals taken for this visit.  Constitutional: Patient appears well-developed and well-nourished. No distress.    Pulmonary/Chest: Effort normal. No respiratory distress.   Abdominal: Soft, ND NT  : WNL  Musculoskeletal: Normal range of motion.    Neurological: Alert and oriented to person, place, and time.  Psychiatric: Normal mood and affect. Behavior is normal. Thought content normal.      Labs:  No results found for: \"TESTOSTERONE\"  Lab Results   Component Value Date    PSA 2.73 11/13/2023     No components found for: \"CBC\"  Lab Results   Component Value Date    CREATININE 1.01 11/13/2023     No components found for: \"TESTOTMS\"  No results found for: \"TESTF\"    Discussion:  Recently had epididymitis to left testicle, had a prior episode in 2018 and recently had similar feeling. Visited with urgent care and was given abx 7 day course and a another 3 day course from PCP. He states his sxs felt better but has not resolved. He has been taking Aleve, I recommended for him to try ibuprofen for discomfort. Also, to have good scrotal support.    AUA 7  NURIA 11    Assessment:    No diagnosis found.    Marianne Huertas is a 63 y.o. male here for FUV     Plan:   1) Will plan for scrotal US and follow up after to review results.   2) All questions and concerns were addressed. Patient verbalizes understanding and has no other questions at this time.     Scribe Attestation  By signing my name below, I, Robbin Powers   attest that this documentation has been prepared under the direction and in the presence of Ishan Shah MD.    "

## 2023-11-16 ENCOUNTER — OFFICE VISIT (OUTPATIENT)
Dept: UROLOGY | Facility: HOSPITAL | Age: 63
End: 2023-11-16
Payer: COMMERCIAL

## 2023-11-16 DIAGNOSIS — N40.1 BENIGN LOCALIZED PROSTATIC HYPERPLASIA WITH LOWER URINARY TRACT SYMPTOMS (LUTS): Primary | ICD-10-CM

## 2023-11-16 DIAGNOSIS — N45.1 EPIDIDYMITIS WITHOUT ABSCESS: ICD-10-CM

## 2023-11-16 PROCEDURE — 3008F BODY MASS INDEX DOCD: CPT | Performed by: UROLOGY

## 2023-11-16 PROCEDURE — 99213 OFFICE O/P EST LOW 20 MIN: CPT | Performed by: UROLOGY

## 2023-11-16 PROCEDURE — 1036F TOBACCO NON-USER: CPT | Performed by: UROLOGY

## 2023-11-17 ENCOUNTER — ANCILLARY PROCEDURE (OUTPATIENT)
Dept: RADIOLOGY | Facility: CLINIC | Age: 63
End: 2023-11-17
Payer: COMMERCIAL

## 2023-11-17 DIAGNOSIS — N45.1 EPIDIDYMITIS WITHOUT ABSCESS: ICD-10-CM

## 2023-11-17 PROCEDURE — 76870 US EXAM SCROTUM: CPT | Performed by: RADIOLOGY

## 2023-11-17 PROCEDURE — 93975 VASCULAR STUDY: CPT

## 2023-12-04 ENCOUNTER — ANESTHESIA EVENT (OUTPATIENT)
Dept: GASTROENTEROLOGY | Facility: EXTERNAL LOCATION | Age: 63
End: 2023-12-04

## 2023-12-05 ENCOUNTER — HOSPITAL ENCOUNTER (OUTPATIENT)
Dept: GASTROENTEROLOGY | Facility: EXTERNAL LOCATION | Age: 63
Discharge: HOME | End: 2023-12-05
Payer: COMMERCIAL

## 2023-12-05 ENCOUNTER — ANESTHESIA (OUTPATIENT)
Dept: GASTROENTEROLOGY | Facility: EXTERNAL LOCATION | Age: 63
End: 2023-12-05

## 2023-12-05 VITALS
SYSTOLIC BLOOD PRESSURE: 125 MMHG | TEMPERATURE: 96.8 F | RESPIRATION RATE: 18 BRPM | DIASTOLIC BLOOD PRESSURE: 85 MMHG | OXYGEN SATURATION: 96 % | HEART RATE: 70 BPM | BODY MASS INDEX: 38.65 KG/M2 | WEIGHT: 270 LBS | HEIGHT: 70 IN

## 2023-12-05 DIAGNOSIS — Z12.11 COLON CANCER SCREENING: ICD-10-CM

## 2023-12-05 PROCEDURE — 88305 TISSUE EXAM BY PATHOLOGIST: CPT

## 2023-12-05 PROCEDURE — 0753T DGTZ GLS MCRSCP SLD LEVEL IV: CPT

## 2023-12-05 PROCEDURE — 88305 TISSUE EXAM BY PATHOLOGIST: CPT | Performed by: PATHOLOGY

## 2023-12-05 PROCEDURE — 45385 COLONOSCOPY W/LESION REMOVAL: CPT | Performed by: STUDENT IN AN ORGANIZED HEALTH CARE EDUCATION/TRAINING PROGRAM

## 2023-12-05 RX ORDER — PROPOFOL 10 MG/ML
INJECTION, EMULSION INTRAVENOUS AS NEEDED
Status: DISCONTINUED | OUTPATIENT
Start: 2023-12-05 | End: 2023-12-05

## 2023-12-05 RX ORDER — LIDOCAINE HYDROCHLORIDE 20 MG/ML
INJECTION, SOLUTION EPIDURAL; INFILTRATION; INTRACAUDAL; PERINEURAL AS NEEDED
Status: DISCONTINUED | OUTPATIENT
Start: 2023-12-05 | End: 2023-12-05

## 2023-12-05 RX ORDER — SODIUM CHLORIDE 9 MG/ML
20 INJECTION, SOLUTION INTRAVENOUS CONTINUOUS
Status: DISCONTINUED | OUTPATIENT
Start: 2023-12-05 | End: 2023-12-06 | Stop reason: HOSPADM

## 2023-12-05 RX ADMIN — PROPOFOL 100 MG: 10 INJECTION, EMULSION INTRAVENOUS at 14:25

## 2023-12-05 RX ADMIN — PROPOFOL 50 MG: 10 INJECTION, EMULSION INTRAVENOUS at 14:34

## 2023-12-05 RX ADMIN — PROPOFOL 50 MG: 10 INJECTION, EMULSION INTRAVENOUS at 14:43

## 2023-12-05 RX ADMIN — LIDOCAINE HYDROCHLORIDE 50 MG: 20 INJECTION, SOLUTION EPIDURAL; INFILTRATION; INTRACAUDAL; PERINEURAL at 14:25

## 2023-12-05 RX ADMIN — SODIUM CHLORIDE: 9 INJECTION, SOLUTION INTRAVENOUS at 14:26

## 2023-12-05 RX ADMIN — PROPOFOL 50 MG: 10 INJECTION, EMULSION INTRAVENOUS at 14:36

## 2023-12-05 RX ADMIN — PROPOFOL 50 MG: 10 INJECTION, EMULSION INTRAVENOUS at 14:39

## 2023-12-05 RX ADMIN — PROPOFOL 50 MG: 10 INJECTION, EMULSION INTRAVENOUS at 14:31

## 2023-12-05 SDOH — HEALTH STABILITY: MENTAL HEALTH: CURRENT SMOKER: 0

## 2023-12-05 ASSESSMENT — PAIN SCALES - GENERAL
PAINLEVEL_OUTOF10: 0 - NO PAIN
PAINLEVEL_OUTOF10: 0 - NO PAIN
PAIN_LEVEL: 0
PAINLEVEL_OUTOF10: 0 - NO PAIN
PAINLEVEL_OUTOF10: 0 - NO PAIN

## 2023-12-05 ASSESSMENT — COLUMBIA-SUICIDE SEVERITY RATING SCALE - C-SSRS
1. IN THE PAST MONTH, HAVE YOU WISHED YOU WERE DEAD OR WISHED YOU COULD GO TO SLEEP AND NOT WAKE UP?: NO
2. HAVE YOU ACTUALLY HAD ANY THOUGHTS OF KILLING YOURSELF?: NO
6. HAVE YOU EVER DONE ANYTHING, STARTED TO DO ANYTHING, OR PREPARED TO DO ANYTHING TO END YOUR LIFE?: NO

## 2023-12-05 ASSESSMENT — PAIN - FUNCTIONAL ASSESSMENT
PAIN_FUNCTIONAL_ASSESSMENT: 0-10

## 2023-12-05 NOTE — ANESTHESIA POSTPROCEDURE EVALUATION
Patient: Marianne Huertas    Procedure Summary       Date: 12/05/23 Room / Location: [facility]    Anesthesia Start: 1425 Anesthesia Stop:     Procedure: COLONOSCOPY Diagnosis: Colon cancer screening    Scheduled Providers: OBDULIA Cerrato RN Responsible Provider: MARLIN Nevarez    Anesthesia Type: MAC ASA Status: 3            Anesthesia Type: MAC    Vitals Value Taken Time   /75 12/05/23 1450   Temp 36 °C (96.8 °F) 12/05/23 1450   Pulse 73 12/05/23 1450   Resp 12 12/05/23 1450   SpO2 96 % 12/05/23 1450       Anesthesia Post Evaluation    Patient location during evaluation: bedside  Patient participation: complete - patient cannot participate  Level of consciousness: awake and responsive to verbal stimuli  Pain score: 0  Pain management: adequate  Airway patency: patent  Cardiovascular status: acceptable and hemodynamically stable  Respiratory status: acceptable  Hydration status: acceptable  Postoperative Nausea and Vomiting: none        No notable events documented.

## 2023-12-05 NOTE — ANESTHESIA PREPROCEDURE EVALUATION
Patient: Marianne Huertas    Procedure Information       Date/Time: 12/05/23 1430    Scheduled providers: Jaison Drake DO; Mer Mcelroy RN    Procedure: COLONOSCOPY    Location: [facility]            Relevant Problems   Cardiovascular   (+) Dyslipidemia, goal LDL below 100   (+) Essential hypertension      Endocrine   (+) Class 2 severe obesity with serious comorbidity and body mass index (BMI) of 39.0 to 39.9 in adult (CMS/[facility])      GI   (+) Esophageal reflux   (+) Hiatal hernia      Neuro/Psych   (+) CTS (carpal tunnel syndrome)   (+) Carpal tunnel syndrome   (+) Ulnar neuropathy at elbow      Pulmonary   (+) Obstructive sleep apnea on CPAP      Musculoskeletal   (+) CTS (carpal tunnel syndrome)   (+) Carpal tunnel syndrome      Infectious Disease   (+) Herpes zoster   (+) URI (upper respiratory infection)      Other   (+) Patellar tendinitis of left knee       Clinical information reviewed:    Allergies                NPO Detail:  No data recorded     Physical Exam    Airway  Mallampati: III  TM distance: >3 FB  Neck ROM: full     Cardiovascular - normal exam     Dental - normal exam     Pulmonary - normal exam  Breath sounds clear to auscultation     Abdominal            Anesthesia Plan    ASA 3     MAC     The patient is not a current smoker.    intravenous induction   Anesthetic plan and risks discussed with patient.  Use of blood products discussed with patient who.    Plan discussed with CRNA.

## 2023-12-05 NOTE — DISCHARGE INSTRUCTIONS
Patient Instructions Post Procedure      The anesthetics, sedatives or narcotics which were given to you today will be acting in your body for the next 24 hours, so you might feel a little sleepy or groggy.  This feeling should slowly wear off. Carefully read and follow the instructions.     You received sedation today:  - Do not drive or operate any machinery or power tools of any kind.   - No alcoholic beverages today, not even beer or wine.  - Do not make any important decisions or sign any legal documents.  - No over the counter medications that contain alcohol or that may cause drowsiness.    While it is common to experience mild to moderate abdominal distention, gas, or belching after your procedure, if any of these symptoms occur following discharge from the GI Lab or within one week of having your procedure, call the [facility] [place] to be advised whether a visit to your nearest Urgent Care or Emergency Department is indicated.  Take this paper with you if you go.   - If you develop an allergic reaction to the medications that were given during your procedure such as difficulty breathing, rash, hives, severe nausea, vomiting or lightheadedness.  - If you experience chest pain, shortness of breath, severe abdominal pain, fevers and chills.  -If you develop signs and symptoms of bleeding such as blood in your spit, if your stools turn black, tarry, or bloody  - If you have not urinated within 8 hours following your procedure.  - If your IV site becomes painful, red, inflamed, or looks infected.    If you received a biopsy/polypectomy/sphincterotomy the following instructions apply below:  __ Do not use Aspirin containing products, non-steroidal medications or anti-coagulants for one week following your procedure. (Examples of these types of medications are: Advil, Arthrotec, Aleve, Coumadin, Ecotrin, Heparin, Ibuprofen, Indocin, Motrin, Naprosyn, Nuprin, Plavix, Vioxx, and Voltarin, or their generic  forms.  This list is not all-inclusive.  Check with your physician or pharmacist before resuming medications.)   __ Eat a soft diet today.  Avoid foods that are poorly digested for the next 24 hours.  These foods would include: nuts, beans, lettuce, red meats, and fried foods. Start with liquids and advance your diet as tolerated, gradually work up to eating solids.   __ Do not have a Barium Study or Enema for one week.    Your physician recommends the additional following instructions:    -You have a contact number available for emergencies. The signs and symptoms of potential delayed complications were discussed with you. You may return to normal activities tomorrow.  -Resume your previous diet or other if specified.  -Continue your present medications.   -We are waiting for your pathology results, if applicable.  -The findings and recommendations have been discussed with you and/or family.  - Please see Medication Reconciliation Form for new medication/medications prescribed.     If you experience any problems or have any questions following discharge from the GI Lab, please call: [phone] from 7 am- 4:30 pm.  In the event of an emergency please go to the closest Emergency Department or call

## 2023-12-05 NOTE — H&P
Outpatient Hospital Procedure H&P    Patient Profile  Name Marianne Huertas  Date of Birth 1960  MRN [id number]  PCP Dylon Barnett        Diagnosis: Colon cancer screening.  Procedure(s):  Colonoscopy.    Allergies  Allergies   Allergen Reactions    Cat Dander Unknown    House Dust Mite Other       Past Medical History   Past Medical History:   Diagnosis Date    Allergies     Body mass index (BMI) 36.0-36.9, adult 09/15/2018    BMI 36.0-36.9,adult    Chest pain, unspecified 11/09/2018    Chest pain syndrome    Cough, unspecified 01/10/2014    Cough    Elevated blood-pressure reading, without diagnosis of hypertension 07/21/2015    Elevated blood pressure reading without diagnosis of hypertension    Fracture of left shoulder girdle, part unspecified, initial encounter for closed fracture 02/28/2015    Fracture of bone of shoulder, left, closed, initial encounter    GERD (gastroesophageal reflux disease)     Male erectile dysfunction, unspecified 11/08/2018    Male erectile disorder of organic origin    Obstructive sleep apnea (adult) (pediatric) 03/16/2019    Obstructive sleep apnea on CPAP    Other abnormal glucose 09/27/2021    Hemoglobin A1c above reference range    Other general symptoms and signs 03/21/2016    Flu-like symptoms    Pain in left shoulder 12/01/2014    Left shoulder pain    Pain in right shoulder 12/01/2014    Right shoulder pain    Personal history of other diseases of male genital organs 03/31/2022    History of epididymitis    Personal history of other diseases of the digestive system 07/21/2015    History of constipation    Personal history of other diseases of the digestive system 10/21/2015    History of umbilical hernia    Personal history of other diseases of the nervous system and sense organs 02/28/2015    History of carpal tunnel syndrome    Personal history of other diseases of the nervous system and sense organs 08/19/2014    History of otitis media    Personal history of other  diseases of the nervous system and sense organs 03/16/2019    History of sleep apnea    Personal history of other diseases of the respiratory system 09/13/2014    Personal history of asthma    Personal history of other diseases of the respiratory system 03/21/2016    History of acute sinusitis    Personal history of other endocrine, nutritional and metabolic disease 01/18/2018    History of obesity    Personal history of other specified conditions 07/30/2020    History of gross hematuria    Personal history of other specified conditions 02/16/2018    History of snoring    Personal history of other specified conditions 01/09/2014    History of headache    Umbilical hernia with obstruction, without gangrene 08/19/2015    Incarcerated umbilical hernia    Unspecified abdominal pain 07/21/2015    Abdominal cramping    Unspecified abdominal pain 09/27/2021    Abdominal wall pain    Unspecified abdominal pain 09/27/2021    Abdominal wall pain    Unspecified dacryocystitis of right lacrimal passage 02/13/2016    Dacrocystitis, right       Medication Reviewed - yes  Prior to Admission medications    Medication Sig Start Date End Date Taking? Authorizing Provider   cholecalciferol (Vitamin D-3) 25 MCG (1000 UT) tablet Take 1 tablet (25 mcg) by mouth once daily. 2/14/18  Yes Historical Provider, MD   doxazosin (Cardura) 4 mg tablet Take 1 tablet (4 mg) by mouth once daily at bedtime. 7/7/23 7/6/24 Yes Dylon Barnett MD   fexofenadine (Allegra) 180 mg tablet Take by mouth. 4/7/15  Yes Historical Provider, MD   fluticasone (Flonase) 50 mcg/actuation nasal spray Administer into affected nostril(s). 2/26/15  Yes Historical Provider, MD   multivitamin tablet Take 1 tablet by mouth once daily. 2/14/18  Yes Historical Provider, MD   pantoprazole (ProtoNix) 40 mg EC tablet  12/14/15  Yes Historical Provider, MD   sildenafil (Viagra) 50 mg tablet Take by mouth. 11/8/18  Yes Historical Provider, MD   wheat dextrin (Benefiber Clear SF,  dextrin,) 3 gram/3.5 gram powder in packet Take by mouth. 9/12/14  Yes Historical Provider, MD   Bacillus coagulans (PROBIOTIC, B. COAGULANS, ORAL) Take 1 capsule by mouth early in the morning..    Historical Provider, MD   polyethylene glycol (Glycolax) 17 gram/dose powder Take by mouth. 9/24/14   Historical Provider, MD       Physical Exam  Vitals:    12/05/23 1408   BP: 135/77   Pulse: 71   Resp: 12   Temp: 36.7 °C (98.1 °F)   SpO2: 95%      Weight   Vitals:    12/05/23 1408   Weight: 122 kg (270 lb)     BMI Body mass index is 38.74 kg/m².    General: A&Ox3, NAD.  HEENT: AT/NC.   CV: RRR. No murmur.  Resp: CTA bilaterally. No wheezing, rhonchi or rales.   GI: Soft, NT/ND. BSx4.  Extrem: No edema. Pulses intact.  Skin: No Jaundice.   Neuro: No focal deficits.   Psych: Normal mood and affect.        Oropharyngeal Classification III (soft and hard palate and base of uvula visible)  ASA PS Classification 3  Sedation Plan: Deep Sedation.  Procedure Plan - pre-procedural (re)assesment completed by physician:  discharge/transfer patient when discharge criteria met    Jaison Drake DO  12/5/2023 2:17 PM

## 2023-12-12 NOTE — PROGRESS NOTES
FUV    Last seen - 11/16/23     HISTORY OF PRESENT ILLNESS:   Marianne Huertas is a 63 y.o. male who is being seen today to review scrotal US results.    PAST MEDICAL HISTORY:  Past Medical History:   Diagnosis Date    Allergies     Body mass index (BMI) 36.0-36.9, adult 09/15/2018    BMI 36.0-36.9,adult    Chest pain, unspecified 11/09/2018    Chest pain syndrome    Cough, unspecified 01/10/2014    Cough    Elevated blood-pressure reading, without diagnosis of hypertension 07/21/2015    Elevated blood pressure reading without diagnosis of hypertension    Fracture of left shoulder girdle, part unspecified, initial encounter for closed fracture 02/28/2015    Fracture of bone of shoulder, left, closed, initial encounter    GERD (gastroesophageal reflux disease)     Male erectile dysfunction, unspecified 11/08/2018    Male erectile disorder of organic origin    Obstructive sleep apnea (adult) (pediatric) 03/16/2019    Obstructive sleep apnea on CPAP    Other abnormal glucose 09/27/2021    Hemoglobin A1c above reference range    Other general symptoms and signs 03/21/2016    Flu-like symptoms    Pain in left shoulder 12/01/2014    Left shoulder pain    Pain in right shoulder 12/01/2014    Right shoulder pain    Personal history of other diseases of male genital organs 03/31/2022    History of epididymitis    Personal history of other diseases of the digestive system 07/21/2015    History of constipation    Personal history of other diseases of the digestive system 10/21/2015    History of umbilical hernia    Personal history of other diseases of the nervous system and sense organs 02/28/2015    History of carpal tunnel syndrome    Personal history of other diseases of the nervous system and sense organs 08/19/2014    History of otitis media    Personal history of other diseases of the nervous system and sense organs 03/16/2019    History of sleep apnea    Personal history of other diseases of the respiratory system  09/13/2014    Personal history of asthma    Personal history of other diseases of the respiratory system 03/21/2016    History of acute sinusitis    Personal history of other endocrine, nutritional and metabolic disease 01/18/2018    History of obesity    Personal history of other specified conditions 07/30/2020    History of gross hematuria    Personal history of other specified conditions 02/16/2018    History of snoring    Personal history of other specified conditions 01/09/2014    History of headache    Umbilical hernia with obstruction, without gangrene 08/19/2015    Incarcerated umbilical hernia    Unspecified abdominal pain 07/21/2015    Abdominal cramping    Unspecified abdominal pain 09/27/2021    Abdominal wall pain    Unspecified abdominal pain 09/27/2021    Abdominal wall pain    Unspecified dacryocystitis of right lacrimal passage 02/13/2016    Dacrocystitis, right   - Renal cysts  - Epididymitis  - Gross hematuria with negative workup 03/19  - BPH  - ED    PAST SURGICAL HISTORY:  Past Surgical History:   Procedure Laterality Date    COLONOSCOPY  12/13/2013    Complete Colonoscopy    CYSTOSCOPY  12/13/2013    Diagnostic Cystoscopy    ESOPHAGOGASTRODUODENOSCOPY  05/23/2017    Diagnostic Esophagogastroduodenoscopy    KNEE ARTHROSCOPY W/ MENISCAL REPAIR      OTHER SURGICAL HISTORY  12/13/2013    Neuroplasty With Transposition Of Ulnar Nerve - At Elbow    OTHER SURGICAL HISTORY  05/24/2021    Hernia Repair Inguinal Unilateral    UMBILICAL HERNIA REPAIR  11/17/2015    Umbilical Hernia Repair        ALLERGIES:   Allergies   Allergen Reactions    Cat Dander Unknown    House Dust Mite Other        MEDICATIONS:   Current Outpatient Medications   Medication Instructions    Bacillus coagulans (PROBIOTIC, B. COAGULANS, ORAL) 1 capsule, oral, Daily    cholecalciferol (Vitamin D-3) 25 MCG (1000 UT) tablet 1 tablet, oral, Daily    doxazosin (CARDURA) 4 mg, oral, Nightly    fexofenadine (Allegra) 180 mg tablet oral     "fluticasone (Flonase) 50 mcg/actuation nasal spray nasal    multivitamin tablet 1 tablet, oral, Daily    pantoprazole (ProtoNix) 40 mg EC tablet     polyethylene glycol (Glycolax) 17 gram/dose powder oral    sildenafil (Viagra) 50 mg tablet oral    wheat dextrin (Benefiber Clear SF, dextrin,) 3 gram/3.5 gram powder in packet oral        PHYSICAL EXAM:  There were no vitals taken for this visit.  Constitutional: Patient appears well-developed and well-nourished. No distress.    Pulmonary/Chest: Effort normal. No respiratory distress.   Abdominal: Soft, ND NT  : WNL  Musculoskeletal: Normal range of motion.    Neurological: Alert and oriented to person, place, and time.  Psychiatric: Normal mood and affect. Behavior is normal. Thought content normal.      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 No new discrete testicular lesion. Intact symmetric blood flow to both testes. Very small right hydrocele. Right varicocele similar to prior.  - Results reviewed with patient. Patient reassured.    Discussion:  Reports scrotal discomfort has improved but still has occasional discomfort. Encouraged to take anti-inflammatory medications to help with this and to wear scrotal support. Pt agrees with plan. He is also concerned about slowly rising PSA. Will continue to monitor PSA.    Assessment:    No diagnosis found.    Marianne Huertas is a 63 y.o. male here for FUV     Plan:   1) Follow up in 6 months with PSA prior.  2) All questions and concerns were addressed. Patient verbalizes understanding and has no other questions at this time.     Scribe Attestation  By signing my name below, I, Robibn Powers "   attest that this documentation has been prepared under the direction and in the presence of Ishan Shah MD.

## 2023-12-13 LAB
LABORATORY COMMENT REPORT: NORMAL
PATH REPORT.FINAL DX SPEC: NORMAL
PATH REPORT.GROSS SPEC: NORMAL
PATH REPORT.MICROSCOPIC SPEC OTHER STN: NORMAL
PATH REPORT.RELEVANT HX SPEC: NORMAL
PATH REPORT.TOTAL CANCER: NORMAL

## 2023-12-14 ENCOUNTER — OFFICE VISIT (OUTPATIENT)
Dept: UROLOGY | Facility: HOSPITAL | Age: 63
End: 2023-12-14
Payer: COMMERCIAL

## 2023-12-14 DIAGNOSIS — Z12.5 SCREENING FOR PROSTATE CANCER: ICD-10-CM

## 2023-12-14 PROCEDURE — 1036F TOBACCO NON-USER: CPT | Performed by: UROLOGY

## 2023-12-14 PROCEDURE — 99214 OFFICE O/P EST MOD 30 MIN: CPT | Performed by: UROLOGY

## 2023-12-14 PROCEDURE — 3008F BODY MASS INDEX DOCD: CPT | Performed by: UROLOGY

## 2023-12-28 ENCOUNTER — OFFICE VISIT (OUTPATIENT)
Dept: ORTHOPEDIC SURGERY | Facility: CLINIC | Age: 63
End: 2023-12-28
Payer: COMMERCIAL

## 2023-12-28 DIAGNOSIS — S83.232A COMPLEX TEAR OF MEDIAL MENISCUS OF LEFT KNEE AS CURRENT INJURY, INITIAL ENCOUNTER: Primary | ICD-10-CM

## 2023-12-28 DIAGNOSIS — M25.562 CHRONIC PAIN OF LEFT KNEE: ICD-10-CM

## 2023-12-28 DIAGNOSIS — G89.29 CHRONIC PAIN OF LEFT KNEE: ICD-10-CM

## 2023-12-28 DIAGNOSIS — M25.362 KNEE INSTABILITY, LEFT: ICD-10-CM

## 2023-12-28 PROCEDURE — L1812 KO ELASTIC W/JOINTS PRE OTS: HCPCS | Performed by: FAMILY MEDICINE

## 2023-12-28 PROCEDURE — 99213 OFFICE O/P EST LOW 20 MIN: CPT | Performed by: FAMILY MEDICINE

## 2023-12-28 PROCEDURE — 2500000004 HC RX 250 GENERAL PHARMACY W/ HCPCS (ALT 636 FOR OP/ED): Mod: JZ | Performed by: FAMILY MEDICINE

## 2023-12-28 PROCEDURE — 3008F BODY MASS INDEX DOCD: CPT | Performed by: FAMILY MEDICINE

## 2023-12-28 PROCEDURE — 99214 OFFICE O/P EST MOD 30 MIN: CPT | Performed by: FAMILY MEDICINE

## 2023-12-28 PROCEDURE — 1036F TOBACCO NON-USER: CPT | Performed by: FAMILY MEDICINE

## 2023-12-28 PROCEDURE — 20611 DRAIN/INJ JOINT/BURSA W/US: CPT | Performed by: FAMILY MEDICINE

## 2023-12-28 RX ADMIN — Medication 6 ML: at 13:27

## 2023-12-28 NOTE — PROGRESS NOTES
Patient ID: Marianne Huertas is a 63 y.o. male.    L Inj/Asp: L knee on 12/28/2023 1:27 PM  Indications: pain  Details: 22 G needle, ultrasound-guided superolateral approach  Medications: 6 mL hylan 48 mg/6 mL  Outcome: tolerated well, no immediate complications  Procedure, treatment alternatives, risks and benefits explained, specific risks discussed. Consent was given by the patient. Immediately prior to procedure a time out was called to verify the correct patient, procedure, equipment, support staff and site/side marked as required. Patient was prepped and draped in the usual sterile fashion.

## 2023-12-28 NOTE — LETTER
January 4, 2024     Dylon Barnett MD  [address]  [facility], [place] [address]  [facility] [address]    Patient: Marianne Huertas   YOB: 1960   Date of Visit: 12/28/2023       Dear Dr. Dylon Barnett MD:    Thank you for referring Marianne Huertas to me for evaluation. Below are my notes for this consultation.  If you have questions, please do not hesitate to call me. I look forward to following your patient along with you.       Sincerely,     Andrea Fitzgerald MD      CC: CIPRIANO Rose MD  ______________________________________________________________________________________    Sports Medicine Office Note    Today's Date:  12/28/2023     HPI: Marianne Huertas is a 63 y.o.  who presents today for evaluation of left knee pain for possible viscosupplementation upon referral by Dr. Tello.    He presents with his wife and reports persistent pain postoperatively at the left knee status post partial meniscectomy several months ago.  He has gone through physical therapy with persistent pain.  He describes medial and subpatellar pain that is exacerbated with daily activities and recreational activities.  He denies interval injury or trauma.  He has not had injections before.  He does not use any bracing.  He would like to be more active and have less pain.  He has no problems with the opposite knee.    He has no other complaints.    Review of Systems  Constitutional: no fever, no chills, not feeling tired, no recent weight gain and no recent weight loss.   ENT: no nosebleeds.   Cardiovascular: no chest pain.   Respiratory: no shortness of breath and no cough.   Gastrointestinal: no abdominal pain, no nausea, no diarrhea and no vomiting.   Musculoskeletal: as noted in HPI and no arthralgias.   Integumentary: no rashes and no skin wound.   Neurological: no headache.   Psychiatric: no sleep disturbances and no depression.   Endocrine: no muscle weakness and no  muscle cramps.   Hematologic/Lymphatic: no swollen glands and no tendency for easy bruising.    Physical Examination:     The LEFT knee is without obvious signs of acute bony deformity, swelling, erythema, ecchymosis or joint effusion. The patella is without tenderness. Apprehension is negative with medial and lateral glide. Patella crepitus is positive. Patella grind is negative. The medial joint line is tender and without bony crepitus or step-off. The lateral joint line is nontender and without bony crepitus or step-off. Flexion & extension are full and symmetrical.  Valgus stress test is positive for laxity but no pain at 0 degrees.  Varus stress test at 0° and 30° of flexion, Lachman's, Lynsey's, and posterior drawer are all negative. The opposite knee is nontender and stable. Gait is pain-free and tandem.    Constitutional: General appearance = Alert and in no acute distress. Well developed, well nourished.   Head and face: Normal.    Eyes: External Eye, Conjunctiva and Lids=Normal external exam; pupils were equal in size, round, reactive to light (PERRL) and extraocular movements intact (EOMI).   Ears, Nose, Mouth, and Throat: External inspection of ears and nose=Normal.   Hearing= Normal.    Neck: No neck mass was observed. Supple.   Pulmonary: Respiratory effort = No respiratory distress.   Cardiovascular: Examination of extremities= No peripheral edema.   Skin and subcutaneous tissue: Normal skin color and pigmentation, normal skin turgor, and no rash; palpation of skin and subcutaneous tissue=Normal.    Neurologic: Sensation= Normal.    Psychiatric: Judgment and insight= Intact.  Orientation to person, place, and time: Alert and oriented x 3.  Mood and affect= Normal.    Imaging:  Radiographs of the left knee previously obtained in our health system were reviewed and revealed medial and patellofemoral compartment arthrosis.  There are no signs of acute fractures.  The studies were reviewed by me  personally in the office today.    Procedure Note:    After consent was obtained, the LEFT knee was prepped in a sterile fashion. Ultrasound guidance was used to help insure proper needle placement into the knee joint, decrease patient discomfort, and decrease collateral damage. The joint was visualized and Synvisc 1 was injected without any complications. Ultrasound images were saved on an internal file for later reference. The patient tolerated the procedure well and the area was cleaned and bandaged.        Problem List Items Addressed This Visit             ICD-10-CM    Complex tear of medial meniscus of left knee as current injury - Primary S83.232A    Relevant Orders    KO Medial OA     Knee pain M25.569    Relevant Orders    Point of Care Ultrasound (Completed)     Other Visit Diagnoses         Codes    Knee instability, left     M25.362    Relevant Orders    KO Medial OA             Assessment and Plan:     We reviewed the exam and x-ray findings and discussed the conservative and surgical treatment options. We agreed to HA injection with Synvisc 2 help his chronic pain.  We agreed to try a medial  knee brace to help decrease his pain and instability with ambulatory activities.  He will follow-up in 8 weeks for recheck.    **Patient was prescribed a reaction OA medial  for knee DJD and instability.The patient is ambulatory with or without aid; but, has weakness, instability and/or deformity of their left lower extremity which requires stabilization from this orthosis to improve their function.      Verbal and written instructions for the use, wear schedule, cleaning and application of this item were given.  Patient was instructed that should the brace result in increased pain, decreased sensation, increased swelling, or an overall worsening of their medical condition, to please contact our office immediately.     Orthotic management and training was provided for skin care,  modifications due to healing tissues, edema changes, interruption in skin integrity, and safety precautions with the orthosis.      **This note was dictated using Dragon speech recognition software and was not corrected for spelling or grammatical errors**.    Andrea Fitzgerald MD  Sports Medicine Specialist  [facility] [place]

## 2023-12-28 NOTE — PROGRESS NOTES
Sports Medicine Office Note    Today's Date:  12/28/2023     HPI: Marianne Huertas is a 63 y.o.  who presents today for evaluation of left knee pain for possible viscosupplementation upon referral by Dr. Tello.    He presents with his wife and reports persistent pain postoperatively at the left knee status post partial meniscectomy several months ago.  He has gone through physical therapy with persistent pain.  He describes medial and subpatellar pain that is exacerbated with daily activities and recreational activities.  He denies interval injury or trauma.  He has not had injections before.  He does not use any bracing.  He would like to be more active and have less pain.  He has no problems with the opposite knee.    He has no other complaints.    Review of Systems  Constitutional: no fever, no chills, not feeling tired, no recent weight gain and no recent weight loss.   ENT: no nosebleeds.   Cardiovascular: no chest pain.   Respiratory: no shortness of breath and no cough.   Gastrointestinal: no abdominal pain, no nausea, no diarrhea and no vomiting.   Musculoskeletal: as noted in HPI and no arthralgias.   Integumentary: no rashes and no skin wound.   Neurological: no headache.   Psychiatric: no sleep disturbances and no depression.   Endocrine: no muscle weakness and no muscle cramps.   Hematologic/Lymphatic: no swollen glands and no tendency for easy bruising.    Physical Examination:     The LEFT knee is without obvious signs of acute bony deformity, swelling, erythema, ecchymosis or joint effusion. The patella is without tenderness. Apprehension is negative with medial and lateral glide. Patella crepitus is positive. Patella grind is negative. The medial joint line is tender and without bony crepitus or step-off. The lateral joint line is nontender and without bony crepitus or step-off. Flexion & extension are full and symmetrical.  Valgus stress test is positive for laxity but no pain  at 0 degrees.  Varus stress test at 0° and 30° of flexion, Lachman's, Lynsey's, and posterior drawer are all negative. The opposite knee is nontender and stable. Gait is pain-free and tandem.    Constitutional: General appearance = Alert and in no acute distress. Well developed, well nourished.   Head and face: Normal.    Eyes: External Eye, Conjunctiva and Lids=Normal external exam; pupils were equal in size, round, reactive to light (PERRL) and extraocular movements intact (EOMI).   Ears, Nose, Mouth, and Throat: External inspection of ears and nose=Normal.   Hearing= Normal.    Neck: No neck mass was observed. Supple.   Pulmonary: Respiratory effort = No respiratory distress.   Cardiovascular: Examination of extremities= No peripheral edema.   Skin and subcutaneous tissue: Normal skin color and pigmentation, normal skin turgor, and no rash; palpation of skin and subcutaneous tissue=Normal.    Neurologic: Sensation= Normal.    Psychiatric: Judgment and insight= Intact.  Orientation to person, place, and time: Alert and oriented x 3.  Mood and affect= Normal.    Imaging:  Radiographs of the left knee previously obtained in our health system were reviewed and revealed medial and patellofemoral compartment arthrosis.  There are no signs of acute fractures.  The studies were reviewed by me personally in the office today.    Procedure Note:    After consent was obtained, the LEFT knee was prepped in a sterile fashion. Ultrasound guidance was used to help insure proper needle placement into the knee joint, decrease patient discomfort, and decrease collateral damage. The joint was visualized and Synvisc 1 was injected without any complications. Ultrasound images were saved on an internal file for later reference. The patient tolerated the procedure well and the area was cleaned and bandaged.        Problem List Items Addressed This Visit             ICD-10-CM    Complex tear of medial meniscus of left knee as current  injury - Primary S83.232A    Relevant Orders    KO Medial OA     Knee pain M25.569    Relevant Orders    Point of Care Ultrasound (Completed)     Other Visit Diagnoses         Codes    Knee instability, left     M25.362    Relevant Orders    KO Medial OA             Assessment and Plan:     We reviewed the exam and x-ray findings and discussed the conservative and surgical treatment options. We agreed to HA injection with Synvisc 2 help his chronic pain.  We agreed to try a medial  knee brace to help decrease his pain and instability with ambulatory activities.  He will follow-up in 8 weeks for recheck.    **Patient was prescribed a reaction OA medial  for knee DJD and instability.The patient is ambulatory with or without aid; but, has weakness, instability and/or deformity of their left lower extremity which requires stabilization from this orthosis to improve their function.      Verbal and written instructions for the use, wear schedule, cleaning and application of this item were given.  Patient was instructed that should the brace result in increased pain, decreased sensation, increased swelling, or an overall worsening of their medical condition, to please contact our office immediately.     Orthotic management and training was provided for skin care, modifications due to healing tissues, edema changes, interruption in skin integrity, and safety precautions with the orthosis.      **This note was dictated using Dragon speech recognition software and was not corrected for spelling or grammatical errors**.    Andrea Fitzgerald MD  Sports Medicine Specialist  [facility] [place]

## 2024-02-22 ENCOUNTER — APPOINTMENT (OUTPATIENT)
Dept: ORTHOPEDIC SURGERY | Facility: CLINIC | Age: 64
End: 2024-02-22
Payer: COMMERCIAL

## 2024-03-07 ENCOUNTER — OFFICE VISIT (OUTPATIENT)
Dept: ORTHOPEDIC SURGERY | Facility: CLINIC | Age: 64
End: 2024-03-07
Payer: COMMERCIAL

## 2024-03-07 DIAGNOSIS — M25.362 KNEE INSTABILITY, LEFT: ICD-10-CM

## 2024-03-07 DIAGNOSIS — M25.562 CHRONIC PAIN OF LEFT KNEE: ICD-10-CM

## 2024-03-07 DIAGNOSIS — S83.232A COMPLEX TEAR OF MEDIAL MENISCUS OF LEFT KNEE AS CURRENT INJURY, INITIAL ENCOUNTER: Primary | ICD-10-CM

## 2024-03-07 DIAGNOSIS — G89.29 CHRONIC PAIN OF LEFT KNEE: ICD-10-CM

## 2024-03-07 PROCEDURE — 3008F BODY MASS INDEX DOCD: CPT | Performed by: FAMILY MEDICINE

## 2024-03-07 PROCEDURE — 99213 OFFICE O/P EST LOW 20 MIN: CPT | Performed by: FAMILY MEDICINE

## 2024-03-07 PROCEDURE — 1036F TOBACCO NON-USER: CPT | Performed by: FAMILY MEDICINE

## 2024-03-07 NOTE — PROGRESS NOTES
Sports Medicine Office Note    Today's Date:  03/07/2024     HPI: Marianne Huertas is a 64 y.o.  who returns today for follow-up of left knee DJD status post a trial of viscosupplementation with Synvisc 1.    On 12/28/2023, he presented for evaluation of left knee pain for possible viscosupplementation upon referral by Dr. Tello. He presents with his wife and reports persistent pain postoperatively at the left knee status post partial meniscectomy several months ago.  He has gone through physical therapy with persistent pain.  He describes medial and subpatellar pain that is exacerbated with daily activities and recreational activities.  He denies interval injury or trauma.  He has not had injections before.  He does not use any bracing.  He would like to be more active and have less pain.  He has no problems with the opposite knee.  We agreed to HA injection with Synvisc 1 to help his chronic pain.  We agreed to try a medial  knee brace to help decrease his pain and instability with ambulatory activities.  He will follow-up in 8 weeks for recheck.    Today, 3/7/2024, he returns for an 8-week follow-up of chronic left knee pain status post Synvisc 1 injection for degenerative meniscus tear.  He is doing much better.  Prior to the injection and surgery he was having daily pain.  After his surgery with Dr. Tello he was having pain once or twice a week.  Now he states he gets mild discomfort once every 2 weeks.  He feels he is approximately 4 times better.  He is able to exercise on his elliptical machine.  He denies any interval injury or trauma.    He has no other complaints.    Physical Examination:     The RIGHT knee is nontender and stable.  The LEFT knee is without effusion. Patella crepitus is positive. There is minimal tenderness to the medial and lateral joint lines. Flexion and extension are without mechanical blocking. There is no instability with stress testing.   Skin - no  rashes, sores, or open lesions. Strength, sensory and vascular exams are otherwise normal. There is no clubbing, cyanosis or edema.  Gait is slightly antalgic and tandem.      Imaging:  Radiographs of the left knee previously obtained in our health system were reviewed and revealed medial and patellofemoral compartment arthrosis.  There are no signs of acute fractures.  The studies were reviewed by me personally in the office today.    Problem List Items Addressed This Visit             ICD-10-CM    Complex tear of medial meniscus of left knee as current injury - Primary S83.232A    Knee pain M25.569     Other Visit Diagnoses         Codes    Knee instability, left     M25.362            Assessment and Plan:     We reviewed the exam and x-ray findings and discussed the conservative and surgical treatment options. We agreed that he responded very well to our trial of viscosupplementation with Synvisc 1.  He understands that this can be repeated every 6 months or later.  Activity modifications were reviewed.  He will continue his current conservative treatment plan and follow-up when his pain returns.    **This note was dictated using Dragon speech recognition software and was not corrected for spelling or grammatical errors**.    Andrea Fitzgerald MD  Sports Medicine Specialist  [facility] [place]

## 2024-05-30 ENCOUNTER — LAB (OUTPATIENT)
Dept: LAB | Facility: LAB | Age: 64
End: 2024-05-30
Payer: COMMERCIAL

## 2024-05-30 DIAGNOSIS — R73.03 PREDIABETES: Chronic | ICD-10-CM

## 2024-05-30 DIAGNOSIS — E55.9 VITAMIN D DEFICIENCY: Chronic | ICD-10-CM

## 2024-05-30 DIAGNOSIS — Z12.5 SCREENING FOR PROSTATE CANCER: ICD-10-CM

## 2024-05-30 LAB
25(OH)D3 SERPL-MCNC: 37 NG/ML (ref 30–100)
ANION GAP SERPL CALC-SCNC: 13 MMOL/L (ref 10–20)
BUN SERPL-MCNC: 22 MG/DL (ref 6–23)
CALCIUM SERPL-MCNC: 9.1 MG/DL (ref 8.6–10.6)
CHLORIDE SERPL-SCNC: 104 MMOL/L (ref 98–107)
CO2 SERPL-SCNC: 25 MMOL/L (ref 21–32)
CREAT SERPL-MCNC: 1.07 MG/DL (ref 0.5–1.3)
EGFRCR SERPLBLD CKD-EPI 2021: 78 ML/MIN/1.73M*2
EST. AVERAGE GLUCOSE BLD GHB EST-MCNC: 123 MG/DL
GLUCOSE SERPL-MCNC: 97 MG/DL (ref 74–99)
HBA1C MFR BLD: 5.9 %
POTASSIUM SERPL-SCNC: 4.1 MMOL/L (ref 3.5–5.3)
PSA SERPL-MCNC: 2.49 NG/ML
SODIUM SERPL-SCNC: 138 MMOL/L (ref 136–145)

## 2024-05-30 PROCEDURE — 84153 ASSAY OF PSA TOTAL: CPT

## 2024-05-30 PROCEDURE — 36415 COLL VENOUS BLD VENIPUNCTURE: CPT

## 2024-05-30 PROCEDURE — 83036 HEMOGLOBIN GLYCOSYLATED A1C: CPT

## 2024-05-30 PROCEDURE — 80048 BASIC METABOLIC PNL TOTAL CA: CPT

## 2024-05-30 PROCEDURE — 82306 VITAMIN D 25 HYDROXY: CPT

## 2024-06-01 NOTE — RESULT ENCOUNTER NOTE
Results reviewed. No urgent findings.  Will Review results in detail at upcoming office appointment scheduled soon.      Dylon Barnett MD

## 2024-06-03 ENCOUNTER — OFFICE VISIT (OUTPATIENT)
Dept: PRIMARY CARE | Facility: CLINIC | Age: 64
End: 2024-06-03
Payer: COMMERCIAL

## 2024-06-03 VITALS
RESPIRATION RATE: 16 BRPM | OXYGEN SATURATION: 98 % | BODY MASS INDEX: 38.85 KG/M2 | HEART RATE: 87 BPM | DIASTOLIC BLOOD PRESSURE: 76 MMHG | SYSTOLIC BLOOD PRESSURE: 114 MMHG | HEIGHT: 70 IN | WEIGHT: 271.39 LBS | TEMPERATURE: 98 F

## 2024-06-03 DIAGNOSIS — G47.33 OBSTRUCTIVE SLEEP APNEA ON CPAP: ICD-10-CM

## 2024-06-03 DIAGNOSIS — Z23 IMMUNIZATION DUE: ICD-10-CM

## 2024-06-03 DIAGNOSIS — E55.9 VITAMIN D DEFICIENCY: ICD-10-CM

## 2024-06-03 DIAGNOSIS — R35.1 BENIGN PROSTATIC HYPERPLASIA WITH NOCTURIA: ICD-10-CM

## 2024-06-03 DIAGNOSIS — E78.5 DYSLIPIDEMIA, GOAL LDL BELOW 100: ICD-10-CM

## 2024-06-03 DIAGNOSIS — R73.03 PREDIABETES: Primary | ICD-10-CM

## 2024-06-03 DIAGNOSIS — J45.20 MILD INTERMITTENT REACTIVE AIRWAY DISEASE WITHOUT COMPLICATION (HHS-HCC): ICD-10-CM

## 2024-06-03 DIAGNOSIS — N40.1 BENIGN PROSTATIC HYPERPLASIA WITH NOCTURIA: ICD-10-CM

## 2024-06-03 DIAGNOSIS — D12.6 TUBULAR ADENOMA OF COLON: ICD-10-CM

## 2024-06-03 PROCEDURE — 99214 OFFICE O/P EST MOD 30 MIN: CPT | Performed by: INTERNAL MEDICINE

## 2024-06-03 PROCEDURE — 3078F DIAST BP <80 MM HG: CPT | Performed by: INTERNAL MEDICINE

## 2024-06-03 PROCEDURE — 3008F BODY MASS INDEX DOCD: CPT | Performed by: INTERNAL MEDICINE

## 2024-06-03 PROCEDURE — 3074F SYST BP LT 130 MM HG: CPT | Performed by: INTERNAL MEDICINE

## 2024-06-03 RX ORDER — CHOLECALCIFEROL (VITAMIN D3) 25 MCG
2000 TABLET ORAL DAILY
Status: SHIPPED | COMMUNITY
Start: 2024-06-03

## 2024-06-03 ASSESSMENT — PATIENT HEALTH QUESTIONNAIRE - PHQ9
2. FEELING DOWN, DEPRESSED OR HOPELESS: NOT AT ALL
1. LITTLE INTEREST OR PLEASURE IN DOING THINGS: NOT AT ALL
SUM OF ALL RESPONSES TO PHQ9 QUESTIONS 1 AND 2: 0

## 2024-06-03 NOTE — PROGRESS NOTES
"Subjective   Patient ID: Marianne Huertas is a 64 y.o. male who presents for Follow-up (Pt is here due to a 6 month follow up).    Here for follow-up.  He has had some left nipple sensitivity over the last 2 months or so.  He has been using a topical steroid based ointment from his dermatologist which he does not think has helped much.    Left knee is doing much better following the gel injection in December    No exertional chest pain, palpitations, dizziness, orthopnea or pedal edema.         Review of Systems    Objective   /76   Pulse 87   Temp 36.7 °C (98 °F) (Temporal)   Resp 16   Ht 1.778 m (5' 10\")   Wt 123 kg (271 lb 6.2 oz)   SpO2 98%   BMI 38.94 kg/m²     Physical Exam  Constitutional:       Appearance: Normal appearance.   HENT:      Head: Normocephalic and atraumatic.      Nose: Nose normal.   Eyes:      General: No scleral icterus.     Extraocular Movements: Extraocular movements intact.      Conjunctiva/sclera: Conjunctivae normal.      Pupils: Pupils are equal, round, and reactive to light.   Cardiovascular:      Rate and Rhythm: Normal rate and regular rhythm.      Pulses: Normal pulses.      Heart sounds: Normal heart sounds. No murmur heard.  Pulmonary:      Effort: Pulmonary effort is normal. No respiratory distress.      Breath sounds: Normal breath sounds. No stridor. No wheezing.   Chest:   Breasts:     Breasts are symmetrical.      Right: Normal. No swelling, bleeding, inverted nipple, mass, nipple discharge, skin change or tenderness.      Left: Tenderness present. No swelling, bleeding, inverted nipple, mass, nipple discharge or skin change.      Comments: Slight sensitivity left nipple area  Abdominal:      General: Abdomen is flat. Bowel sounds are normal. There is no distension.      Palpations: Abdomen is soft. There is no mass.      Tenderness: There is no abdominal tenderness. There is no guarding.   Musculoskeletal:         General: No swelling, tenderness or " deformity. Normal range of motion.      Cervical back: Normal range of motion and neck supple. No tenderness.   Lymphadenopathy:      Cervical: No cervical adenopathy.      Upper Body:      Right upper body: No supraclavicular adenopathy.      Left upper body: No supraclavicular adenopathy.   Skin:     General: Skin is warm and dry.      Findings: No lesion or rash.   Neurological:      General: No focal deficit present.      Mental Status: He is alert and oriented to person, place, and time.      Cranial Nerves: No cranial nerve deficit.      Motor: No weakness.   Psychiatric:         Mood and Affect: Mood normal.         Behavior: Behavior normal.         Thought Content: Thought content normal.         Judgment: Judgment normal.         Assessment/Plan   Problem List Items Addressed This Visit             ICD-10-CM    Benign enlargement of prostate N40.0    Dyslipidemia, goal LDL below 100 E78.5    Relevant Orders    Comprehensive Metabolic Panel    Lipid Panel    TSH with reflex to Free T4 if abnormal    Obstructive sleep apnea on CPAP G47.33    Relevant Orders    CBC    Prediabetes - Primary R73.03    Relevant Orders    Hemoglobin A1C    Reactive airway disease ([facility]) J45.909    Vitamin D deficiency (Chronic) E55.9    Relevant Orders    Vitamin D 25-Hydroxy,Total (for eval of Vitamin D levels)    Tubular adenoma of colon D12.6     Other Visit Diagnoses         Codes    Immunization due     Z23             Portions of this encounter note have been copied from my previous note dated 11/14/2023, which have been updated where appropriate and all reflect my current medical decision making from today.     Hx  Left knee meniscal tear 10/22-requiring arthroscopy with Dr. Tello late February 2023.  He had some lingering left knee pain, which improved after the gel injection 12/23.    10-year cardiac risk September 20 21- 6.4%-he understands he will likely need a statin at some point over the next several years.  Fortunately calcium score = zero March 2018 We will continue to reassess annually. Weight loss would be in his best interest as well as exercise consistently     History of Atypical chest pain syndrome- September 2018- this came on when he was reaching with his left arm and resolve spontaneously after 5 hours. Evaluation in the emergency room showed no pulmonary embolus did have some pleuritic component with the pain worsening with deep inspiration. Cardiac enzymes negative. Presently he has no musculoskeletal or respiratory symptoms. Will defer further evaluation presently. No recurrence     Hypertension- blood pressure improved. He will continue weight loss efforts and doxazosin. Blood pressure improved on recheck     Elevated weight with BMI over 30 / dyslipidemia- in the past, discussed swimming and weight loss efforts in the past. In the past, encouraged to follow up with a dietician when he is back in town to see what foods he could eat.                11/23-BMI 39.6.  He will continue his diet and exercise efforts              6/24-BMI 38.9.  Weight is down 5 pounds in 6 months.  He will continue efforts.       Exercise routine - before knee injury elliptical at home-3 times weekly-he also walks the dogs daily. Suggested he try to advance his elliptical intensity          He plans to resume his shorter walks and elliptical workouts this winter     Prediabetes- reviewed his A1c trend since 2014. Recently improved- A1c 5.9% March 2022. Reviewed information sheet on A1c, and the relation to impaired glucose metabolism. Home glucometer suggested-check his glucose at times. He will continue long-term weight loss efforts and will follow the A1c semiannually             11/23-A1c stable at 6%.  We will follow semiannually              6/24-A1c a bit improved at 5.9%.        Left nipple sensitivity-6/24-not improved with the topical steroid provided by his dermatologist recently-exam unremarkable.  Discussed the  possibility that with the asymmetry, the left nipple may be some irritation or abrasion that the right nipple has not experienced.  He will watch DNI to make sure that there is no inadvertent trauma to this area.  He will notify me over the next several weeks if not improved.    Perennial allergic rhinitis/reactive airway disease/recent cough-he will continue fluticasone and Allegra daily-especially with spring pollen anticipated   Discussed upcoming spring season-he will have his medicines on hand as needed     Sleep apnea- CPAP nightly continues. no issues. He has one he travels w/ unit too       Right lower abdominal wall discomfort-occasionally recurrent-not recently problematic     Remote right inguinal hernia repair-as an infant-no obvious hernia on past recheck.     Acid reflux/dyspepsia/hiatal hernia- EGD updated 2014- esophagitis- Prevacid helped-he is off of this presently. Certain foods cause abdominal symptoms and he tends to avoid these. Less symptoms when he does not eat is late in the evening. No swallowing discomfort encouraged him to travel with this and use this with any recurrent abdominal acid symptoms. Pepcid used as needed as well.     IBS- he'll has pain just left of the umbilical area, evaluated in GI by Dr. Espinoza in the past. long-standing abdominal bloating with certain foods Now Pepcid as needed for breakthrough symptoms in the evening. Benefiber used consistently & MiraLAX daily. In the past suggested probiotic daily.. Overall with better diet and weight loss, using his antacid far less frequently, typically just before spicy meals   With constipation he tends to have left mid abdominal discomfort which has happened recently. Encouraged ample fluid and fiber to avoid constipation- which he has been doing consistently   He will continue colonoscopy surveillance per Dr. Espinoza-every 10 years. Updated September 2014. Next in September 2024.     Colonoscopy completed 12/24    Tubular  adenomatous colon polyp-noted on 12/24 colonoscopy.  Next recommended 5 years-12/29.                          Urology care with past right orchitis / Hx epididymitis/gross hematuria/BPH/ED/urology care/family history of bladder cancer-father-recent cystoscopy and CT urogram per Dr. Shah.             11/23-PSA stable.  He recently had a bout of left testicular pain diagnosed as epididymitis in urgent care.  He received 7 days of Levaquin-still some residual symptoms.  He will take 3 more days of Levaquin and discussed with Dr. Shah who he coincidentally sees later this week.              6/24-PSA normal.  He is scheduled to see Dr. Shah this month.       History of umbilical hernia repair-Asymptomatic     Left knee pain - neg. ortho eval incl. x-rays per Dr. Graham; PT suggested. Mild arthritis on x-ray. There may be a meniscal injury as well. Fortunately improved presently.     Bilateral knee pain- occasional and alternating. No effusion. S/p downhill walk while in [place]. Not problematic at this time. He will call with concerns.     History of left shoulder dislocation-he saw Dr. Chichendance; surgery not needed. Asymptomatic presently.      Bilateral plantar fasciitis- occas an issue. insoles helpful     Remote left elbow ulnar transposition surgery/bilateral carpal tunnel symptoms-although an EMG did not necessarily show carpal tunnel he states, he uses wrist splints most nights which help hand tingling during the daytime. Encouraged optimal position including keeping his elbow straight at night as he has mainly fourth and fifth finger tingling symptoms more suggestive of ulnar issues        Skin care -family history of melanoma-father--he will continue to see Dr. Martins annually; no concerns     Dental care-encouraged semiannual dental visits. Next appt in 12/22.     Dry eyes / Vision care-vision visits encouraged at least biannually. updated 2017. Reading glasses sufficient. May need an update to his  spectacles for distance.         Flu shot each fall-updated 9/23     Covid series completed. Three booster shots completed.   Last shot 9/23     Shingrix- updated 12/19.    RSV-he will get that soon    Tdap booster-will be needed before 11/25     Follow-up semiannually or sooner as needed     Charting was completed using voice recognition technology and may include unintended errors.

## 2024-06-04 PROBLEM — D12.6 TUBULAR ADENOMA OF COLON: Status: ACTIVE | Noted: 2024-06-04

## 2024-06-13 ENCOUNTER — APPOINTMENT (OUTPATIENT)
Dept: UROLOGY | Facility: HOSPITAL | Age: 64
End: 2024-06-13
Payer: COMMERCIAL

## 2024-06-20 NOTE — PROGRESS NOTES
FUV    Last seen - 12/14/23     HISTORY OF PRESENT ILLNESS:   Marianne Huertas is a 64 y.o. male who is being seen today for FUV with PSA results    PAST MEDICAL HISTORY:  Past Medical History:   Diagnosis Date    Allergies     Body mass index (BMI) 36.0-36.9, adult 09/15/2018    BMI 36.0-36.9,adult    Chest pain, unspecified 11/09/2018    Chest pain syndrome    Cough, unspecified 01/10/2014    Cough    Elevated blood-pressure reading, without diagnosis of hypertension 07/21/2015    Elevated blood pressure reading without diagnosis of hypertension    Fracture of left shoulder girdle, part unspecified, initial encounter for closed fracture 02/28/2015    Fracture of bone of shoulder, left, closed, initial encounter    GERD (gastroesophageal reflux disease)     Male erectile dysfunction, unspecified 11/08/2018    Male erectile disorder of organic origin    Obstructive sleep apnea (adult) (pediatric) 03/16/2019    Obstructive sleep apnea on CPAP    Other abnormal glucose 09/27/2021    Hemoglobin A1c above reference range    Other general symptoms and signs 03/21/2016    Flu-like symptoms    Pain in left shoulder 12/01/2014    Left shoulder pain    Pain in right shoulder 12/01/2014    Right shoulder pain    Personal history of other diseases of male genital organs 03/31/2022    History of epididymitis    Personal history of other diseases of the digestive system 07/21/2015    History of constipation    Personal history of other diseases of the digestive system 10/21/2015    History of umbilical hernia    Personal history of other diseases of the nervous system and sense organs 02/28/2015    History of carpal tunnel syndrome    Personal history of other diseases of the nervous system and sense organs 08/19/2014    History of otitis media    Personal history of other diseases of the nervous system and sense organs 03/16/2019    History of sleep apnea    Personal history of other diseases of the respiratory system  09/13/2014    Personal history of asthma    Personal history of other diseases of the respiratory system 03/21/2016    History of acute sinusitis    Personal history of other endocrine, nutritional and metabolic disease 01/18/2018    History of obesity    Personal history of other specified conditions 07/30/2020    History of gross hematuria    Personal history of other specified conditions 02/16/2018    History of snoring    Personal history of other specified conditions 01/09/2014    History of headache    Umbilical hernia with obstruction, without gangrene 08/19/2015    Incarcerated umbilical hernia    Unspecified abdominal pain 07/21/2015    Abdominal cramping    Unspecified abdominal pain 09/27/2021    Abdominal wall pain    Unspecified abdominal pain 09/27/2021    Abdominal wall pain    Unspecified dacryocystitis of right lacrimal passage 02/13/2016    Dacrocystitis, right   - Renal cysts  - Epididymitis  - Gross hematuria with negative workup 03/19  - BPH  - ED    PAST SURGICAL HISTORY:  Past Surgical History:   Procedure Laterality Date    COLONOSCOPY  12/13/2013    Complete Colonoscopy    CYSTOSCOPY  12/13/2013    Diagnostic Cystoscopy    ESOPHAGOGASTRODUODENOSCOPY  05/23/2017    Diagnostic Esophagogastroduodenoscopy    KNEE ARTHROSCOPY W/ MENISCAL REPAIR      OTHER SURGICAL HISTORY  12/13/2013    Neuroplasty With Transposition Of Ulnar Nerve - At Elbow    OTHER SURGICAL HISTORY  05/24/2021    Hernia Repair Inguinal Unilateral    UMBILICAL HERNIA REPAIR  11/17/2015    Umbilical Hernia Repair        ALLERGIES:   Allergies   Allergen Reactions    Cat Dander Unknown    House Dust Mite Other        MEDICATIONS:   Current Outpatient Medications   Medication Instructions    cholecalciferol (VITAMIN D-3) 2,000 Units, oral, Daily    doxazosin (CARDURA) 4 mg, oral, Nightly    fexofenadine (Allegra) 180 mg tablet oral    fluticasone (Flonase) 50 mcg/actuation nasal spray nasal    multivitamin tablet 1 tablet, oral,  "Daily    pantoprazole (ProtoNix) 40 mg EC tablet Take 1 tablet (40 mg) by mouth once daily as needed.    polyethylene glycol (Glycolax) 17 gram/dose powder oral    sildenafil (Viagra) 50 mg tablet oral        PHYSICAL EXAM:  There were no vitals taken for this visit.  Constitutional: Patient appears well-developed and well-nourished. No distress.    Pulmonary/Chest: Effort normal. No respiratory distress.   Abdominal: Soft, ND NT  : WNL  Musculoskeletal: Normal range of motion.    Neurological: Alert and oriented to person, place, and time.  Psychiatric: Normal mood and affect. Behavior is normal. Thought content normal.      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 No new discrete testicular lesion. Intact symmetric blood flow to both testes. Very small right hydrocele. Right varicocele similar to prior.  - Results reviewed with patient. Patient reassured.    Discussion:  No bothersome urinary complaints. Testicular discomfort has resolved. Taking Viagra for ED. Does not require any refills.  He is also concerned about slowly rising PSA. Pt reassured. Denies fhx of prostate CA. Will continue to monitor PSA. Follow up in 6 months with PSA prior.     Assessment:    No diagnosis found.    Marianne Huertas is a 64 y.o. male here for FUV     Plan:   1) Follow up in 6 months with PSA prior.  2) All questions and concerns were addressed. Patient verbalizes understanding and has no other questions at this time.     Scribe Attestation  By signing my name below, IJaymie Scribe   attest that this documentation has been prepared under the direction and in the presence of Ishan Shah MD.      "

## 2024-06-21 ENCOUNTER — OFFICE VISIT (OUTPATIENT)
Dept: UROLOGY | Facility: HOSPITAL | Age: 64
End: 2024-06-21
Payer: COMMERCIAL

## 2024-06-21 DIAGNOSIS — Z12.5 SCREENING FOR PROSTATE CANCER: ICD-10-CM

## 2024-06-21 PROCEDURE — 99213 OFFICE O/P EST LOW 20 MIN: CPT | Performed by: UROLOGY

## 2024-06-21 PROCEDURE — 3008F BODY MASS INDEX DOCD: CPT | Performed by: UROLOGY

## 2024-06-27 ENCOUNTER — APPOINTMENT (OUTPATIENT)
Dept: UROLOGY | Facility: HOSPITAL | Age: 64
End: 2024-06-27
Payer: COMMERCIAL

## 2024-07-10 DIAGNOSIS — R35.1 BENIGN PROSTATIC HYPERPLASIA WITH NOCTURIA: ICD-10-CM

## 2024-07-10 DIAGNOSIS — N40.1 BENIGN PROSTATIC HYPERPLASIA WITH NOCTURIA: ICD-10-CM

## 2024-07-10 DIAGNOSIS — I10 ESSENTIAL HYPERTENSION: ICD-10-CM

## 2024-07-10 RX ORDER — DOXAZOSIN 4 MG/1
4 TABLET ORAL NIGHTLY
Qty: 90 TABLET | Refills: 3 | Status: SHIPPED | OUTPATIENT
Start: 2024-07-10 | End: 2025-07-10

## 2024-07-23 ENCOUNTER — APPOINTMENT (OUTPATIENT)
Dept: PRIMARY CARE | Facility: CLINIC | Age: 64
End: 2024-07-23
Payer: COMMERCIAL

## 2024-07-23 ENCOUNTER — LAB (OUTPATIENT)
Dept: LAB | Facility: LAB | Age: 64
End: 2024-07-23
Payer: COMMERCIAL

## 2024-07-23 VITALS
WEIGHT: 273.6 LBS | HEART RATE: 66 BPM | HEIGHT: 70 IN | TEMPERATURE: 97.7 F | SYSTOLIC BLOOD PRESSURE: 138 MMHG | DIASTOLIC BLOOD PRESSURE: 88 MMHG | OXYGEN SATURATION: 95 % | BODY MASS INDEX: 39.17 KG/M2

## 2024-07-23 DIAGNOSIS — N62 GYNECOMASTIA, MALE: ICD-10-CM

## 2024-07-23 DIAGNOSIS — I10 ESSENTIAL HYPERTENSION: ICD-10-CM

## 2024-07-23 DIAGNOSIS — N62 GYNECOMASTIA, MALE: Primary | ICD-10-CM

## 2024-07-23 LAB
B-HCG SERPL-ACNC: <3 MIU/ML
ESTRADIOL SERPL-MCNC: 41 PG/ML
LH SERPL-ACNC: 3.7 IU/L

## 2024-07-23 PROCEDURE — 3075F SYST BP GE 130 - 139MM HG: CPT | Performed by: INTERNAL MEDICINE

## 2024-07-23 PROCEDURE — 3008F BODY MASS INDEX DOCD: CPT | Performed by: INTERNAL MEDICINE

## 2024-07-23 PROCEDURE — 83002 ASSAY OF GONADOTROPIN (LH): CPT

## 2024-07-23 PROCEDURE — 84702 CHORIONIC GONADOTROPIN TEST: CPT

## 2024-07-23 PROCEDURE — 36415 COLL VENOUS BLD VENIPUNCTURE: CPT

## 2024-07-23 PROCEDURE — 82670 ASSAY OF TOTAL ESTRADIOL: CPT

## 2024-07-23 PROCEDURE — 1036F TOBACCO NON-USER: CPT | Performed by: INTERNAL MEDICINE

## 2024-07-23 PROCEDURE — 84402 ASSAY OF FREE TESTOSTERONE: CPT

## 2024-07-23 PROCEDURE — 99213 OFFICE O/P EST LOW 20 MIN: CPT | Performed by: INTERNAL MEDICINE

## 2024-07-23 PROCEDURE — 3079F DIAST BP 80-89 MM HG: CPT | Performed by: INTERNAL MEDICINE

## 2024-07-23 ASSESSMENT — ENCOUNTER SYMPTOMS
DEPRESSION: 0
LOSS OF SENSATION IN FEET: 0
OCCASIONAL FEELINGS OF UNSTEADINESS: 0

## 2024-07-23 NOTE — PROGRESS NOTES
"Subjective   Patient ID: Marianne Huertas is a 64 y.o. male who presents for Follow-up.    Here to review left breast pain-specifically sensitivity around the left nipple which has been present since early April.  He saw his dermatologist who tried some triamcinolone cream that did not really do much.  He has not noticed any breast masses.  No breast trauma no injury denies fevers chills night sweats no nipple discharge         Review of Systems    Objective   /88   Pulse 66   Temp 36.5 °C (97.7 °F)   Ht 1.778 m (5' 10\")   Wt 124 kg (273 lb 9.6 oz)   SpO2 95%   BMI 39.26 kg/m²     Physical Exam  Vitals reviewed.   Constitutional:       Appearance: Normal appearance.   HENT:      Head: Normocephalic and atraumatic.   Eyes:      General: No scleral icterus.        Right eye: No discharge.         Left eye: No discharge.      Extraocular Movements: Extraocular movements intact.      Conjunctiva/sclera: Conjunctivae normal.      Pupils: Pupils are equal, round, and reactive to light.   Cardiovascular:      Rate and Rhythm: Normal rate and regular rhythm.      Pulses: Normal pulses.      Heart sounds: Normal heart sounds. No murmur heard.  Pulmonary:      Effort: Pulmonary effort is normal.      Breath sounds: Normal breath sounds. No wheezing or rhonchi.   Chest:   Breasts:     Breasts are symmetrical.      Right: Normal. No swelling, bleeding, inverted nipple, mass, nipple discharge, skin change or tenderness.      Left: Normal. No swelling, bleeding, inverted nipple, mass, nipple discharge, skin change or tenderness.      Comments: Unremarkable breast exam.  He had no pain on palpation of the breast or nipple area  Musculoskeletal:         General: No deformity or signs of injury. Normal range of motion.      Cervical back: Normal range of motion and neck supple. No rigidity or tenderness.   Lymphadenopathy:      Cervical: No cervical adenopathy.      Upper Body:      Right upper body: No " supraclavicular, axillary or pectoral adenopathy.      Left upper body: No supraclavicular, axillary or pectoral adenopathy.   Skin:     General: Skin is warm and dry.      Findings: No rash.   Neurological:      General: No focal deficit present.      Mental Status: He is alert and oriented to person, place, and time. Mental status is at baseline.      Cranial Nerves: No cranial nerve deficit.      Sensory: No sensory deficit.      Gait: Gait normal.   Psychiatric:         Mood and Affect: Mood normal.         Behavior: Behavior normal.         Thought Content: Thought content normal.         Judgment: Judgment normal.         Assessment/Plan   Problem List Items Addressed This Visit             ICD-10-CM    Essential hypertension I10    Relevant Orders    Follow Up In [facility] Pharm[facility]     Other Visit Diagnoses         Codes    Gynecomastia, male    -  Primary N62    Relevant Orders    Testosterone, total and free    Luteinizing Hormone    Estradiol    HCG, tumor marker             Gynecomastia-history and exam nondiagnostic.  He will do labs including testosterone estradiol luteinizing hormone and hCG.  He will follow-up and notify me if symptoms change or if he notices any masses.  Patient information provided regarding gynecomastia    Hypertension-blood pressure elevated-he will bring his machine in to have it checked with our pharmacy team and follow at home-understanding the goal blood pressure should be consistently under 130/90.  Will notify me if that is not the case.    He will call with concerns otherwise follow-up as scheduled    Charting was completed using voice recognition technology and may include unintended errors.

## 2024-07-27 LAB
TESTOSTERONE FREE (CHAN): 36.7 PG/ML (ref 35–155)
TESTOSTERONE,TOTAL,LC-MS/MS: 187 NG/DL (ref 250–1100)

## 2024-07-31 ENCOUNTER — APPOINTMENT (OUTPATIENT)
Dept: PRIMARY CARE | Facility: CLINIC | Age: 64
End: 2024-07-31
Payer: COMMERCIAL

## 2024-08-06 DIAGNOSIS — E28.0 ESTRADIOL EXCESS: Primary | ICD-10-CM

## 2024-08-07 DIAGNOSIS — N62 GYNECOMASTIA, MALE: Primary | ICD-10-CM

## 2024-08-07 DIAGNOSIS — E28.0 ESTRADIOL EXCESS: ICD-10-CM

## 2024-08-07 DIAGNOSIS — I10 ESSENTIAL HYPERTENSION: ICD-10-CM

## 2024-08-08 ENCOUNTER — HOSPITAL ENCOUNTER (OUTPATIENT)
Dept: RADIOLOGY | Facility: EXTERNAL LOCATION | Age: 64
Discharge: HOME | End: 2024-08-08

## 2024-08-08 ENCOUNTER — OFFICE VISIT (OUTPATIENT)
Dept: ORTHOPEDIC SURGERY | Facility: HOSPITAL | Age: 64
End: 2024-08-08
Payer: COMMERCIAL

## 2024-08-08 ENCOUNTER — HOSPITAL ENCOUNTER (OUTPATIENT)
Dept: RADIOLOGY | Facility: HOSPITAL | Age: 64
Discharge: HOME | End: 2024-08-08
Payer: COMMERCIAL

## 2024-08-08 ENCOUNTER — LAB (OUTPATIENT)
Dept: LAB | Facility: LAB | Age: 64
End: 2024-08-08
Payer: COMMERCIAL

## 2024-08-08 DIAGNOSIS — M23.203 DEGENERATIVE TEAR OF MEDIAL MENISCUS OF RIGHT KNEE: Primary | ICD-10-CM

## 2024-08-08 DIAGNOSIS — I10 ESSENTIAL HYPERTENSION: ICD-10-CM

## 2024-08-08 DIAGNOSIS — E28.0 ESTRADIOL EXCESS: ICD-10-CM

## 2024-08-08 DIAGNOSIS — N62 GYNECOMASTIA, MALE: ICD-10-CM

## 2024-08-08 DIAGNOSIS — M17.11 PRIMARY OSTEOARTHRITIS OF RIGHT KNEE: ICD-10-CM

## 2024-08-08 DIAGNOSIS — S83.232A COMPLEX TEAR OF MEDIAL MENISCUS OF LEFT KNEE AS CURRENT INJURY, INITIAL ENCOUNTER: ICD-10-CM

## 2024-08-08 DIAGNOSIS — M25.561 RIGHT KNEE PAIN, UNSPECIFIED CHRONICITY: ICD-10-CM

## 2024-08-08 DIAGNOSIS — N64.4 BREAST PAIN IN MALE: ICD-10-CM

## 2024-08-08 LAB
CREAT SERPL-MCNC: 1.02 MG/DL (ref 0.5–1.3)
EGFRCR SERPLBLD CKD-EPI 2021: 83 ML/MIN/1.73M*2

## 2024-08-08 PROCEDURE — 2500000004 HC RX 250 GENERAL PHARMACY W/ HCPCS (ALT 636 FOR OP/ED): Mod: JZ | Performed by: FAMILY MEDICINE

## 2024-08-08 PROCEDURE — 73564 X-RAY EXAM KNEE 4 OR MORE: CPT | Mod: RT

## 2024-08-08 PROCEDURE — 99214 OFFICE O/P EST MOD 30 MIN: CPT | Performed by: FAMILY MEDICINE

## 2024-08-08 PROCEDURE — L1812 KO ELASTIC W/JOINTS PRE OTS: HCPCS | Performed by: FAMILY MEDICINE

## 2024-08-08 PROCEDURE — 73564 X-RAY EXAM KNEE 4 OR MORE: CPT | Mod: RIGHT SIDE | Performed by: RADIOLOGY

## 2024-08-08 PROCEDURE — 99214 OFFICE O/P EST MOD 30 MIN: CPT | Mod: 25 | Performed by: FAMILY MEDICINE

## 2024-08-08 PROCEDURE — 20611 DRAIN/INJ JOINT/BURSA W/US: CPT | Mod: LT | Performed by: FAMILY MEDICINE

## 2024-08-08 PROCEDURE — 84146 ASSAY OF PROLACTIN: CPT

## 2024-08-08 ASSESSMENT — PAIN - FUNCTIONAL ASSESSMENT: PAIN_FUNCTIONAL_ASSESSMENT: 0-10

## 2024-08-08 ASSESSMENT — PAIN DESCRIPTION - DESCRIPTORS
DESCRIPTORS: DISCOMFORT;DULL;ACHING
DESCRIPTORS: DISCOMFORT;ACHING

## 2024-08-08 ASSESSMENT — PAIN SCALES - GENERAL
PAINLEVEL_OUTOF10: 6
PAINLEVEL_OUTOF10: 7

## 2024-08-08 NOTE — PROGRESS NOTES
Patient ID: Marianne Huertas is a 64 y.o. male.    L Inj/Asp: L knee on 8/8/2024 2:10 PM  Indications: pain  Details: 21 G needle, ultrasound-guided superolateral approach  Medications: 48 mg hylan 48 mg/6 mL  Outcome: tolerated well, no immediate complications  Procedure, treatment alternatives, risks and benefits explained, specific risks discussed. Consent was given by the patient. Immediately prior to procedure a time out was called to verify the correct patient, procedure, equipment, support staff and site/side marked as required. Patient was prepped and draped in the usual sterile fashion.           Sports Medicine Office Note    Today's Date:  08/08/2024     HPI: Marianne Huertas is a 64 y.o.  who returns today for follow-up of left knee DJD and to repeat Synvisc 1.    On 12/28/2023, he presented for evaluation of left knee pain for possible viscosupplementation upon referral by Dr. Tello. He presents with his wife and reports persistent pain postoperatively at the left knee status post partial meniscectomy several months ago.  He has gone through physical therapy with persistent pain.  He describes medial and subpatellar pain that is exacerbated with daily activities and recreational activities.  He denies interval injury or trauma.  He has not had injections before.  He does not use any bracing.  He would like to be more active and have less pain.  He has no problems with the opposite knee.  We agreed to HA injection with Synvisc 1 to help his chronic pain.  We agreed to try a medial  knee brace to help decrease his pain and instability with ambulatory activities.  He will follow-up in 8 weeks for recheck.    On 3/7/2024, he returns for an 8-week follow-up of chronic left knee pain status post Synvisc 1 injection for degenerative meniscus tear.  He is doing much better.  Prior to the injection and surgery he was having daily pain.  After his surgery with Dr. Tello he was  having pain once or twice a week.  Now he states he gets mild discomfort once every 2 weeks.  He feels he is approximately 4 times better.  He is able to exercise on his elliptical machine.  He denies any interval injury or trauma.  We agreed that he responded very well to our trial of viscosupplementation with Synvisc 1.  He understands that this can be repeated every 6 months or later.  Activity modifications were reviewed.  He will continue his current conservative treatment plan and follow-up when his pain returns.    Today, 8/8/2024, he returns with his wife for follow-up of chronic left knee pain and repeat Synvisc 1 viscosupplementation.  The previous injection given on 12/28/2023 gave him great relief up till 1 to 2 months ago and is started to cause very mild pain that is progressively worsening.  He is still better than he was before the injection but would like better pain control.  He is planning to return to [place] this weekend for a work trip.  He is also complaining of anterior medial right knee pain for the past 2 to 3 weeks.  He denies direct injury or trauma.  He has been taking Aleve 1 tablet daily for the 2 weeks with some temporary relief.  He is concerned he has a similar meniscus tear like his left knee.  He denies interval injury or trauma.    He has no other complaints.    Physical Examination:     The RIGHT knee is without obvious signs of acute bony deformity, swelling, erythema, ecchymosis or joint effusion. Lynsey's is positive to the medial joint line.  The patella is without tenderness. Apprehension is negative with medial and lateral glide. Patella crepitus is positive. Patella grind is negative. The medial joint line is mildly tender and without bony crepitus or step-off. The lateral joint line is nontender and without bony crepitus or step-off. Flexion & extension are full and symmetrical. Varus & valgus stress test at 0° and 30° of flexion, Lachman's, and posterior drawer are all  negative.     The LEFT knee is without effusion. Patella crepitus is positive. There is moderate tenderness to the medial joint line but nontender to the lateral joint lines. Flexion and extension are without mechanical blocking. There is no instability with stress testing.   Skin - no rashes, sores, or open lesions. Strength, sensory and vascular exams are otherwise normal. There is no clubbing, cyanosis or edema.  Gait is slightly antalgic and tandem.      Imaging:  Radiographs of the right knee obtained today were reviewed and revealed no obvious signs of acute fractures or dislocations.  There is mild to moderate narrowing of the patellofemoral joint with lateral patella tilting.  These are similar to left knee x-rays obtained in 2022.    Procedure:  After consent was obtained, the left knee was prepped in a sterile fashion. Ultrasound guidance was used to help insure proper needle placement into the knee joint, decrease patient discomfort, and decrease collateral damage. The joint was visualized and Synvisc 1 was injected without any complications. Ultrasound images were saved on an internal file for later reference. The patient tolerated the procedure well and the area was cleaned and bandaged.    Problem List Items Addressed This Visit             ICD-10-CM    Complex tear of medial meniscus of left knee as current injury S83.232A    Relevant Orders    Point of Care Ultrasound (Completed)    Knee pain M25.569    Relevant Orders    XR knee right 4+ views     Other Visit Diagnoses         Codes    Degenerative tear of medial meniscus of right knee    -  Primary M23.203    Primary osteoarthritis of right knee     M17.11    Relevant Orders    KO Medial OA             Assessment and Plan:     We reviewed the exam and x-ray findings and discussed the conservative and surgical treatment options. We agreed to repeat Synvisc 1 at his left knee for chronic pain control with his degenerative medial meniscus  tear.  He tolerated this well.  Activity modifications were reviewed.  Regarding the new complaint of right knee pain, he likely has a degenerative meniscus tear at this knee also.  We agreed to defer MRI to follow-up as needed.  We will treat him conservatively with a hinged  knee brace, given today.  He can increased his naproxen to prescription strength with 2 tablets twice daily.  We will consider viscosupplementation for his RIGHT knee and he will contact my  at his leisure.  He will follow-up when his pain returns.    **Patient was prescribed a reaction OA medial  knee brace for knee DJD and a degenerative medial meniscus tear.The patient is ambulatory with or without aid; but, has weakness, instability and/or deformity of their right lower extremity which requires stabilization from this orthosis to improve their function.      Verbal and written instructions for the use, wear schedule, cleaning and application of this item were given.  Patient was instructed that should the brace result in increased pain, decreased sensation, increased swelling, or an overall worsening of their medical condition, to please contact our office immediately.     Orthotic management and training was provided for skin care, modifications due to healing tissues, edema changes, interruption in skin integrity, and safety precautions with the orthosis.      **This note was dictated using Dragon speech recognition software and was not corrected for spelling or grammatical errors**.    Andrea Fitzgerald MD  Sports Medicine Specialist  [facility] [place]

## 2024-08-11 DIAGNOSIS — N64.4 BREAST PAIN IN MALE: Primary | ICD-10-CM

## 2024-08-12 LAB — PROLACTIN SERPL-MCNC: 13.8 UG/L (ref 2–18)

## 2024-08-22 ENCOUNTER — HOSPITAL ENCOUNTER (OUTPATIENT)
Dept: RADIOLOGY | Facility: HOSPITAL | Age: 64
Discharge: HOME | End: 2024-08-22
Payer: COMMERCIAL

## 2024-08-22 DIAGNOSIS — I10 ESSENTIAL HYPERTENSION: ICD-10-CM

## 2024-08-22 DIAGNOSIS — N62 GYNECOMASTIA, MALE: ICD-10-CM

## 2024-08-22 DIAGNOSIS — E28.0 ESTRADIOL EXCESS: ICD-10-CM

## 2024-08-22 PROCEDURE — 74150 CT ABDOMEN W/O CONTRAST: CPT

## 2024-08-23 NOTE — RESULT ENCOUNTER NOTE
Marianne    Thank you for doing the adrenal CT scan.  The radiologist notes that the left adrenal gland has a very small benign appearing fatty nodule, which is referred to as a myelolipoma.      The radiologist does not recommend any additional scanning at this time.  Incidentally, the radiologist also notes benign-appearing liver cysts, and a small umbilical hernia without any concerning findings.  There is also a benign simple lower left kidney cyst.    In summary, the CAT scan does not show any worrisome findings.  In light of the absence of any testicular or adrenal gland masses, your slightly elevated estrogen levels must simply be a physiologic finding.  Thanks again for doing the extra scans to help confirm all of this.  At this point no further evaluation is recommended.    Please let me know if you have any questions about all of this.    Sincerely,  Dylon Barnett MD

## 2024-09-09 ENCOUNTER — TELEPHONE (OUTPATIENT)
Dept: ORTHOPEDIC SURGERY | Facility: HOSPITAL | Age: 64
End: 2024-09-09
Payer: COMMERCIAL

## 2024-09-09 NOTE — TELEPHONE ENCOUNTER
Copied from CRM #[id number]. Topic: Needs Earlier Appointment  >> Sep 7, 2024 10:00 AM Javed DIANE wrote:  Patient was told by office of Dr. Servando Tello that there were an earlier appointment open, either a September 16th or 17th but he couldn't remember if it was at the [facility] location or his other locations. He would like to reschedule for one of the them since he will be out of town on the 23rd. He would like the office to please give him a call with the telephone number on file.     Thank you

## 2024-09-16 ENCOUNTER — HOSPITAL ENCOUNTER (OUTPATIENT)
Dept: RADIOLOGY | Facility: HOSPITAL | Age: 64
Discharge: HOME | End: 2024-09-16
Payer: COMMERCIAL

## 2024-09-16 ENCOUNTER — OFFICE VISIT (OUTPATIENT)
Dept: ORTHOPEDIC SURGERY | Facility: CLINIC | Age: 64
End: 2024-09-16
Payer: COMMERCIAL

## 2024-09-16 ENCOUNTER — HOSPITAL ENCOUNTER (OUTPATIENT)
Dept: CARDIOLOGY | Facility: HOSPITAL | Age: 64
Discharge: HOME | End: 2024-09-16
Payer: COMMERCIAL

## 2024-09-16 DIAGNOSIS — M25.561 ACUTE PAIN OF RIGHT KNEE: ICD-10-CM

## 2024-09-16 DIAGNOSIS — M79.89 PAIN AND SWELLING OF RIGHT LOWER LEG: ICD-10-CM

## 2024-09-16 DIAGNOSIS — M79.604 PAIN IN RIGHT LEG: ICD-10-CM

## 2024-09-16 DIAGNOSIS — M79.661 PAIN AND SWELLING OF RIGHT LOWER LEG: ICD-10-CM

## 2024-09-16 PROCEDURE — 73721 MRI JNT OF LWR EXTRE W/O DYE: CPT | Mod: RIGHT SIDE | Performed by: STUDENT IN AN ORGANIZED HEALTH CARE EDUCATION/TRAINING PROGRAM

## 2024-09-16 PROCEDURE — 99214 OFFICE O/P EST MOD 30 MIN: CPT | Performed by: ORTHOPAEDIC SURGERY

## 2024-09-16 PROCEDURE — 93971 EXTREMITY STUDY: CPT | Performed by: SURGERY

## 2024-09-16 PROCEDURE — 73721 MRI JNT OF LWR EXTRE W/O DYE: CPT | Mod: RT

## 2024-09-16 PROCEDURE — 1036F TOBACCO NON-USER: CPT | Performed by: ORTHOPAEDIC SURGERY

## 2024-09-16 PROCEDURE — 93971 EXTREMITY STUDY: CPT

## 2024-09-16 ASSESSMENT — PAIN - FUNCTIONAL ASSESSMENT: PAIN_FUNCTIONAL_ASSESSMENT: 0-10

## 2024-09-16 ASSESSMENT — PAIN SCALES - GENERAL: PAINLEVEL_OUTOF10: 3

## 2024-09-16 NOTE — PROGRESS NOTES
PRIMARY CARE PHYSICIAN: Dylon Barnett MD  REFERRING PROVIDER: No referring provider defined for this encounter.     CONSULT ORTHOPAEDIC: Knee Evaluation    SUBJECTIVE  CHIEF COMPLAINT:   Chief Complaint   Patient presents with    Right Knee - Pain        HPI: Marianne Huertas is a 64 y.o. patient presenting for evaluation of his right knee. He has a history of a left knee arthroscopy with partial meniscectomy in Februrary 2023. He did well with this and has undergone a course of viscosupplementation injections with Dr. Fitzgerald.  He is happy with his current left knee function.      Today he reports acute onset of right knee pain. He reports one month of right medial knee pain that is sharp and occasional catching.  It acutely worsened after a recent trip to [place] doing a lot of walking. The patient localizes the pain to medial joint line.  He is having some difficulty with walking.  His symptoms are interfering with activities which include elliptical.  He denies any constant or progressive numbness or tingling in their legs.     Of note, he does report right calf pain.  This is aching in the mid calf.    REVIEW OF SYSTEMS  Constitutional: See HPI for pain assessment, No significant weight loss, recent trauma  Cardiovascular: No chest pain, shortness of breath  Respiratory: No difficulty breathing, cough  Gastrointestinal: No nausea, vomiting, diarrhea, constipation  Musculoskeletal: Noted in HPI, positive for pain, restricted motion, stiffness  Integumentary: No rashes, easy bruising, redness   Neurological: no numbness or tingling in extremities, no gait disturbances   Psychiatric: No mood changes, memory changes, social issues  Heme/Lymph: no excessive swelling, easy bruising, excessive bleeding  ENT: No hearing changes  Eyes: No vision changes    Past Medical History:   Diagnosis Date    Allergies     Body mass index (BMI) 36.0-36.9, adult 09/15/2018    BMI 36.0-36.9,adult    Chest pain, unspecified 11/09/2018     Chest pain syndrome    Cough, unspecified 01/10/2014    Cough    Elevated blood-pressure reading, without diagnosis of hypertension 07/21/2015    Elevated blood pressure reading without diagnosis of hypertension    Fracture of left shoulder girdle, part unspecified, initial encounter for closed fracture 02/28/2015    Fracture of bone of shoulder, left, closed, initial encounter    GERD (gastroesophageal reflux disease)     Male erectile dysfunction, unspecified 11/08/2018    Male erectile disorder of organic origin    Obstructive sleep apnea (adult) (pediatric) 03/16/2019    Obstructive sleep apnea on CPAP    Other abnormal glucose 09/27/2021    Hemoglobin A1c above reference range    Other general symptoms and signs 03/21/2016    Flu-like symptoms    Pain in left shoulder 12/01/2014    Left shoulder pain    Pain in right shoulder 12/01/2014    Right shoulder pain    Personal history of other diseases of male genital organs 03/31/2022    History of epididymitis    Personal history of other diseases of the digestive system 07/21/2015    History of constipation    Personal history of other diseases of the digestive system 10/21/2015    History of umbilical hernia    Personal history of other diseases of the nervous system and sense organs 02/28/2015    History of carpal tunnel syndrome    Personal history of other diseases of the nervous system and sense organs 08/19/2014    History of otitis media    Personal history of other diseases of the nervous system and sense organs 03/16/2019    History of sleep apnea    Personal history of other diseases of the respiratory system 09/13/2014    Personal history of asthma    Personal history of other diseases of the respiratory system 03/21/2016    History of acute sinusitis    Personal history of other endocrine, nutritional and metabolic disease 01/18/2018    History of obesity    Personal history of other specified conditions 07/30/2020    History of gross hematuria     Personal history of other specified conditions 02/16/2018    History of snoring    Personal history of other specified conditions 01/09/2014    History of headache    Umbilical hernia with obstruction, without gangrene 08/19/2015    Incarcerated umbilical hernia    Unspecified abdominal pain 07/21/2015    Abdominal cramping    Unspecified abdominal pain 09/27/2021    Abdominal wall pain    Unspecified abdominal pain 09/27/2021    Abdominal wall pain    Unspecified dacryocystitis of right lacrimal passage 02/13/2016    Dacrocystitis, right        Allergies   Allergen Reactions    Cat Dander Unknown    House Dust Mite Other        Past Surgical History:   Procedure Laterality Date    COLONOSCOPY  12/13/2013    Complete Colonoscopy    CYSTOSCOPY  12/13/2013    Diagnostic Cystoscopy    ESOPHAGOGASTRODUODENOSCOPY  05/23/2017    Diagnostic Esophagogastroduodenoscopy    KNEE ARTHROSCOPY W/ MENISCAL REPAIR      OTHER SURGICAL HISTORY  12/13/2013    Neuroplasty With Transposition Of Ulnar Nerve - At Elbow    OTHER SURGICAL HISTORY  05/24/2021    Hernia Repair Inguinal Unilateral    UMBILICAL HERNIA REPAIR  11/17/2015    Umbilical Hernia Repair        Family History   Problem Relation Name Age of Onset    Cancer Mother      Heart disease Mother      Kidney cancer Father      Heart disease Father          Social History     Socioeconomic History    Marital status:      Spouse name: Not on file    Number of children: Not on file    Years of education: Not on file    Highest education level: Not on file   Occupational History    Not on file   Tobacco Use    Smoking status: Never    Smokeless tobacco: Never   Vaping Use    Vaping status: Never Used   Substance and Sexual Activity    Alcohol use: Yes     Alcohol/week: 2.0 standard drinks of alcohol     Types: 2 Standard drinks or equivalent per week     Comment: Socially    Drug use: Never    Sexual activity: Not on file   Other Topics Concern    Not on file   Social  "History Narrative    Not on file     Social Determinants of Health     Financial Resource Strain: Not on file   Food Insecurity: Not on file   Transportation Needs: Not on file   Physical Activity: Not on file   Stress: Not on file   Social Connections: Not on file   Intimate Partner Violence: Not on file   Housing Stability: Not on file        CURRENT MEDICATIONS:   Current Outpatient Medications   Medication Sig Dispense Refill    cholecalciferol (Vitamin D-3) 25 MCG (1000 UT) tablet Take 2 tablets (2,000 Units) by mouth once daily.      doxazosin (Cardura) 4 mg tablet Take 1 tablet (4 mg) by mouth once daily at bedtime. 90 tablet 3    fexofenadine (Allegra) 180 mg tablet Take by mouth.      fluticasone (Flonase) 50 mcg/actuation nasal spray Administer into affected nostril(s).      multivitamin tablet Take 1 tablet by mouth once daily.      pantoprazole (ProtoNix) 40 mg EC tablet Take 1 tablet (40 mg) by mouth once daily as needed.      polyethylene glycol (Glycolax) 17 gram/dose powder Take by mouth.      sildenafil (Viagra) 50 mg tablet Take by mouth.       No current facility-administered medications for this visit.        OBJECTIVE  PHYSICAL EXAM      12/5/2023     2:08 PM 12/5/2023     2:50 PM 12/5/2023     3:01 PM 12/5/2023     3:09 PM 6/3/2024    11:06 AM 6/3/2024    11:46 AM 7/23/2024     7:48 AM   Vitals   Systolic 135 107 102 125 132 114 138   Diastolic 77 75 67 85 80 76 88   Heart Rate 71 73 69 70 87  66   Temp 36.7 °C (98.1 °F) 36 °C (96.8 °F)   36.7 °C (98 °F)  36.5 °C (97.7 °F)   Resp 12 12 18 18 16     Height (in) 1.778 m (5' 10\")    1.778 m (5' 10\")  1.778 m (5' 10\")   Weight (lb) 270    271.39  273.6   BMI 38.74 kg/m2    38.94 kg/m2  39.26 kg/m2   BSA (m2) 2.45 m2    2.46 m2  2.47 m2   Visit Report     Report Report Report      64 y.o. year-old in no acute distress. Well nourished. Normal affect. Alert and oriented x 3.   Gait: Antalgic gait Tandem.  Slight bilateral varus alignment. Able to " perform single leg stance with pain. No abnormalities of balance or coordination.    Skin: Intact over the bilateral upper and lower extremities. No erythema, ecchymosis, or temperature changes.  Left knee with well-healed incisions.    Right Knee:  ROM: 0-120 degrees.  Very mild patellofemoral crepitus.  Mild effusion.  Good quadriceps contraction. Intact extensor mechanism. Patellar tendon non-tender.  Patella facets non-tender.   Lachman stable. Posterior drawer negative.  Stable medial collateral ligament. Stable lateral collateral ligament.   POSITIVE medial joint line tenderness.  POSITIVE Lynsey's.  Negative lateral joint line tenderness.  Negative Lynsey's.       Left Knee:  ROM: 0-120 degrees.  Mild patellofemoral crepitus.  No effusion.  Good quadriceps contraction. Intact extensor mechanism. Patellar tendon non-tender.  Patella facets non-tender.  Negative Lynsey's.  Negative lateral joint line tenderness.  Negative Lynsey's.     Motor Strength: 5 out of 5 in the bilateral lower extremities.  Neuro: L4-S1 sensation intact grossly bilaterally. No clonus. Vascular: 2+ DP/PT pulses bilaterally. Bilateral lower extremity compartments supple.    Mild right calf tenderness to palpation.     Imaging: Right knee radiographs were performed today with very mild medial joint space narrowing.  Greater than 3 mm of joint space remaining.  No evidence of acute fracture or osseous loose body.      ASSESSMENT & PLAN    Impression: 64 y.o. male with right knee pain concerning for a medial meniscus tear.    Plan:   I explained to the patient the nature of their diagnosis.  I reviewed their imaging studies with them.    Given his calf pain and recent travel, we will obtain a stat duplex of the RLE.     Based on the history, physical exam and imaging studies above, the patient's presentation is consistent with consistent with the above diagnosis.  I had a long discussion with the patient regarding their presentation  and the treatment options.  We discussed initial nonoperative versus operative management options as well as potential further diagnostic imaging. He has medial joint line tenderness and positive Lynsey's. He has not improved with NSAIDs and home exercise program. We will obtain an MRI to evaluate the meniscus.     Follow-Up: will call with imaging results and discuss next steps.     At the end of the visit, all questions were answered in full. The patient is in agreement with the plan and recommendations. They will call the office with any questions/concerns.    Note dictated with Visualase software. Completed without full typed error editing and sent to avoid delay.

## 2024-09-16 NOTE — H&P (VIEW-ONLY)
PRIMARY CARE PHYSICIAN: Dylon Barnett MD  REFERRING PROVIDER: No referring provider defined for this encounter.     CONSULT ORTHOPAEDIC: Knee Evaluation    SUBJECTIVE  CHIEF COMPLAINT:   Chief Complaint   Patient presents with    Right Knee - Pain        HPI: Marianne Huertas is a 64 y.o. patient presenting for evaluation of his right knee. He has a history of a left knee arthroscopy with partial meniscectomy in Februrary 2023. He did well with this and has undergone a course of viscosupplementation injections with Dr. Fitzgerald.  He is happy with his current left knee function.      Today he reports acute onset of right knee pain. He reports one month of right medial knee pain that is sharp and occasional catching.  It acutely worsened after a recent trip to [place] doing a lot of walking. The patient localizes the pain to medial joint line.  He is having some difficulty with walking.  His symptoms are interfering with activities which include elliptical.  He denies any constant or progressive numbness or tingling in their legs.     Of note, he does report right calf pain.  This is aching in the mid calf.    REVIEW OF SYSTEMS  Constitutional: See HPI for pain assessment, No significant weight loss, recent trauma  Cardiovascular: No chest pain, shortness of breath  Respiratory: No difficulty breathing, cough  Gastrointestinal: No nausea, vomiting, diarrhea, constipation  Musculoskeletal: Noted in HPI, positive for pain, restricted motion, stiffness  Integumentary: No rashes, easy bruising, redness   Neurological: no numbness or tingling in extremities, no gait disturbances   Psychiatric: No mood changes, memory changes, social issues  Heme/Lymph: no excessive swelling, easy bruising, excessive bleeding  ENT: No hearing changes  Eyes: No vision changes    Past Medical History:   Diagnosis Date    Allergies     Body mass index (BMI) 36.0-36.9, adult 09/15/2018    BMI 36.0-36.9,adult    Chest pain, unspecified 11/09/2018     Chest pain syndrome    Cough, unspecified 01/10/2014    Cough    Elevated blood-pressure reading, without diagnosis of hypertension 07/21/2015    Elevated blood pressure reading without diagnosis of hypertension    Fracture of left shoulder girdle, part unspecified, initial encounter for closed fracture 02/28/2015    Fracture of bone of shoulder, left, closed, initial encounter    GERD (gastroesophageal reflux disease)     Male erectile dysfunction, unspecified 11/08/2018    Male erectile disorder of organic origin    Obstructive sleep apnea (adult) (pediatric) 03/16/2019    Obstructive sleep apnea on CPAP    Other abnormal glucose 09/27/2021    Hemoglobin A1c above reference range    Other general symptoms and signs 03/21/2016    Flu-like symptoms    Pain in left shoulder 12/01/2014    Left shoulder pain    Pain in right shoulder 12/01/2014    Right shoulder pain    Personal history of other diseases of male genital organs 03/31/2022    History of epididymitis    Personal history of other diseases of the digestive system 07/21/2015    History of constipation    Personal history of other diseases of the digestive system 10/21/2015    History of umbilical hernia    Personal history of other diseases of the nervous system and sense organs 02/28/2015    History of carpal tunnel syndrome    Personal history of other diseases of the nervous system and sense organs 08/19/2014    History of otitis media    Personal history of other diseases of the nervous system and sense organs 03/16/2019    History of sleep apnea    Personal history of other diseases of the respiratory system 09/13/2014    Personal history of asthma    Personal history of other diseases of the respiratory system 03/21/2016    History of acute sinusitis    Personal history of other endocrine, nutritional and metabolic disease 01/18/2018    History of obesity    Personal history of other specified conditions 07/30/2020    History of gross hematuria     Personal history of other specified conditions 02/16/2018    History of snoring    Personal history of other specified conditions 01/09/2014    History of headache    Umbilical hernia with obstruction, without gangrene 08/19/2015    Incarcerated umbilical hernia    Unspecified abdominal pain 07/21/2015    Abdominal cramping    Unspecified abdominal pain 09/27/2021    Abdominal wall pain    Unspecified abdominal pain 09/27/2021    Abdominal wall pain    Unspecified dacryocystitis of right lacrimal passage 02/13/2016    Dacrocystitis, right        Allergies   Allergen Reactions    Cat Dander Unknown    House Dust Mite Other        Past Surgical History:   Procedure Laterality Date    COLONOSCOPY  12/13/2013    Complete Colonoscopy    CYSTOSCOPY  12/13/2013    Diagnostic Cystoscopy    ESOPHAGOGASTRODUODENOSCOPY  05/23/2017    Diagnostic Esophagogastroduodenoscopy    KNEE ARTHROSCOPY W/ MENISCAL REPAIR      OTHER SURGICAL HISTORY  12/13/2013    Neuroplasty With Transposition Of Ulnar Nerve - At Elbow    OTHER SURGICAL HISTORY  05/24/2021    Hernia Repair Inguinal Unilateral    UMBILICAL HERNIA REPAIR  11/17/2015    Umbilical Hernia Repair        Family History   Problem Relation Name Age of Onset    Cancer Mother      Heart disease Mother      Kidney cancer Father      Heart disease Father          Social History     Socioeconomic History    Marital status:      Spouse name: Not on file    Number of children: Not on file    Years of education: Not on file    Highest education level: Not on file   Occupational History    Not on file   Tobacco Use    Smoking status: Never    Smokeless tobacco: Never   Vaping Use    Vaping status: Never Used   Substance and Sexual Activity    Alcohol use: Yes     Alcohol/week: 2.0 standard drinks of alcohol     Types: 2 Standard drinks or equivalent per week     Comment: Socially    Drug use: Never    Sexual activity: Not on file   Other Topics Concern    Not on file   Social  "History Narrative    Not on file     Social Determinants of Health     Financial Resource Strain: Not on file   Food Insecurity: Not on file   Transportation Needs: Not on file   Physical Activity: Not on file   Stress: Not on file   Social Connections: Not on file   Intimate Partner Violence: Not on file   Housing Stability: Not on file        CURRENT MEDICATIONS:   Current Outpatient Medications   Medication Sig Dispense Refill    cholecalciferol (Vitamin D-3) 25 MCG (1000 UT) tablet Take 2 tablets (2,000 Units) by mouth once daily.      doxazosin (Cardura) 4 mg tablet Take 1 tablet (4 mg) by mouth once daily at bedtime. 90 tablet 3    fexofenadine (Allegra) 180 mg tablet Take by mouth.      fluticasone (Flonase) 50 mcg/actuation nasal spray Administer into affected nostril(s).      multivitamin tablet Take 1 tablet by mouth once daily.      pantoprazole (ProtoNix) 40 mg EC tablet Take 1 tablet (40 mg) by mouth once daily as needed.      polyethylene glycol (Glycolax) 17 gram/dose powder Take by mouth.      sildenafil (Viagra) 50 mg tablet Take by mouth.       No current facility-administered medications for this visit.        OBJECTIVE  PHYSICAL EXAM      12/5/2023     2:08 PM 12/5/2023     2:50 PM 12/5/2023     3:01 PM 12/5/2023     3:09 PM 6/3/2024    11:06 AM 6/3/2024    11:46 AM 7/23/2024     7:48 AM   Vitals   Systolic 135 107 102 125 132 114 138   Diastolic 77 75 67 85 80 76 88   Heart Rate 71 73 69 70 87  66   Temp 36.7 °C (98.1 °F) 36 °C (96.8 °F)   36.7 °C (98 °F)  36.5 °C (97.7 °F)   Resp 12 12 18 18 16     Height (in) 1.778 m (5' 10\")    1.778 m (5' 10\")  1.778 m (5' 10\")   Weight (lb) 270    271.39  273.6   BMI 38.74 kg/m2    38.94 kg/m2  39.26 kg/m2   BSA (m2) 2.45 m2    2.46 m2  2.47 m2   Visit Report     Report Report Report      64 y.o. year-old in no acute distress. Well nourished. Normal affect. Alert and oriented x 3.   Gait: Antalgic gait Tandem.  Slight bilateral varus alignment. Able to " perform single leg stance with pain. No abnormalities of balance or coordination.    Skin: Intact over the bilateral upper and lower extremities. No erythema, ecchymosis, or temperature changes.  Left knee with well-healed incisions.    Right Knee:  ROM: 0-120 degrees.  Very mild patellofemoral crepitus.  Mild effusion.  Good quadriceps contraction. Intact extensor mechanism. Patellar tendon non-tender.  Patella facets non-tender.   Lachman stable. Posterior drawer negative.  Stable medial collateral ligament. Stable lateral collateral ligament.   POSITIVE medial joint line tenderness.  POSITIVE Lynsey's.  Negative lateral joint line tenderness.  Negative Lynsey's.       Left Knee:  ROM: 0-120 degrees.  Mild patellofemoral crepitus.  No effusion.  Good quadriceps contraction. Intact extensor mechanism. Patellar tendon non-tender.  Patella facets non-tender.  Negative Lynsey's.  Negative lateral joint line tenderness.  Negative Lynsey's.     Motor Strength: 5 out of 5 in the bilateral lower extremities.  Neuro: L4-S1 sensation intact grossly bilaterally. No clonus. Vascular: 2+ DP/PT pulses bilaterally. Bilateral lower extremity compartments supple.    Mild right calf tenderness to palpation.     Imaging: Right knee radiographs were performed today with very mild medial joint space narrowing.  Greater than 3 mm of joint space remaining.  No evidence of acute fracture or osseous loose body.      ASSESSMENT & PLAN    Impression: 64 y.o. male with right knee pain concerning for a medial meniscus tear.    Plan:   I explained to the patient the nature of their diagnosis.  I reviewed their imaging studies with them.    Given his calf pain and recent travel, we will obtain a stat duplex of the RLE.     Based on the history, physical exam and imaging studies above, the patient's presentation is consistent with consistent with the above diagnosis.  I had a long discussion with the patient regarding their presentation  and the treatment options.  We discussed initial nonoperative versus operative management options as well as potential further diagnostic imaging. He has medial joint line tenderness and positive Lynsey's. He has not improved with NSAIDs and home exercise program. We will obtain an MRI to evaluate the meniscus.     Follow-Up: will call with imaging results and discuss next steps.     At the end of the visit, all questions were answered in full. The patient is in agreement with the plan and recommendations. They will call the office with any questions/concerns.    Note dictated with [employer] software. Completed without full typed error editing and sent to avoid delay.

## 2024-09-19 ENCOUNTER — DOCUMENTATION (OUTPATIENT)
Dept: OPERATING ROOM | Facility: CLINIC | Age: 64
End: 2024-09-19
Payer: COMMERCIAL

## 2024-09-19 ENCOUNTER — PREP FOR PROCEDURE (OUTPATIENT)
Dept: ORTHOPEDIC SURGERY | Facility: CLINIC | Age: 64
End: 2024-09-19
Payer: COMMERCIAL

## 2024-09-19 DIAGNOSIS — S83.231D COMPLEX TEAR OF MEDIAL MENISCUS OF RIGHT KNEE, SUBSEQUENT ENCOUNTER: ICD-10-CM

## 2024-09-19 DIAGNOSIS — S83.241A ACUTE MEDIAL MENISCUS TEAR OF RIGHT KNEE, INITIAL ENCOUNTER: Primary | ICD-10-CM

## 2024-09-19 NOTE — PROGRESS NOTES
Marianne and I talked on the phone regarding his right knee MRI results and clinical picture.  This reveals a radial tear of the medial meniscus with early chondral thinning.  No full-thickness cartilage loss.  He continues to report symptomatic medial pain that is limiting his function.  He responded well to a prior left knee arthroscopy and has done well with lubricant injections to treat the arthritis.  He would like to repeat this process on his right knee.  We again reviewed right knee arthroscopic partial medial meniscectomy with the associated risks and postoperative course.  Similar to the left side he may have some symptomatic progression of right knee degenerative changes over time.  We also discussed the results of his ultrasound which were negative for DVT.  We will schedule the procedure at his convenience.

## 2024-09-23 ENCOUNTER — APPOINTMENT (OUTPATIENT)
Dept: ORTHOPEDIC SURGERY | Facility: HOSPITAL | Age: 64
End: 2024-09-23
Payer: COMMERCIAL

## 2024-09-25 ENCOUNTER — ANESTHESIA EVENT (OUTPATIENT)
Dept: OPERATING ROOM | Facility: CLINIC | Age: 64
End: 2024-09-25
Payer: COMMERCIAL

## 2024-09-26 ENCOUNTER — HOSPITAL ENCOUNTER (OUTPATIENT)
Facility: CLINIC | Age: 64
Setting detail: OUTPATIENT SURGERY
Discharge: HOME | End: 2024-09-26
Attending: ORTHOPAEDIC SURGERY | Admitting: ORTHOPAEDIC SURGERY
Payer: COMMERCIAL

## 2024-09-26 ENCOUNTER — ANESTHESIA (OUTPATIENT)
Dept: OPERATING ROOM | Facility: CLINIC | Age: 64
End: 2024-09-26
Payer: COMMERCIAL

## 2024-09-26 VITALS
RESPIRATION RATE: 16 BRPM | HEART RATE: 55 BPM | OXYGEN SATURATION: 95 % | BODY MASS INDEX: 38.4 KG/M2 | SYSTOLIC BLOOD PRESSURE: 116 MMHG | TEMPERATURE: 96.8 F | HEIGHT: 71 IN | DIASTOLIC BLOOD PRESSURE: 69 MMHG | WEIGHT: 274.25 LBS

## 2024-09-26 DIAGNOSIS — S83.231D COMPLEX TEAR OF MEDIAL MENISCUS OF RIGHT KNEE, SUBSEQUENT ENCOUNTER: Primary | ICD-10-CM

## 2024-09-26 PROCEDURE — 7100000002 HC RECOVERY ROOM TIME - EACH INCREMENTAL 1 MINUTE: Performed by: ORTHOPAEDIC SURGERY

## 2024-09-26 PROCEDURE — 3600000004 HC OR TIME - INITIAL BASE CHARGE - PROCEDURE LEVEL FOUR: Performed by: ORTHOPAEDIC SURGERY

## 2024-09-26 PROCEDURE — 2720000007 HC OR 272 NO HCPCS: Performed by: ORTHOPAEDIC SURGERY

## 2024-09-26 PROCEDURE — 3700000001 HC GENERAL ANESTHESIA TIME - INITIAL BASE CHARGE: Performed by: ORTHOPAEDIC SURGERY

## 2024-09-26 PROCEDURE — A29881 PR KNEE SCOPE,MED/LAT MENISECTOMY: Performed by: ANESTHESIOLOGIST ASSISTANT

## 2024-09-26 PROCEDURE — 2500000005 HC RX 250 GENERAL PHARMACY W/O HCPCS: Performed by: ANESTHESIOLOGY

## 2024-09-26 PROCEDURE — 2500000005 HC RX 250 GENERAL PHARMACY W/O HCPCS: Performed by: ANESTHESIOLOGIST ASSISTANT

## 2024-09-26 PROCEDURE — 29881 ARTHRS KNE SRG MNISECTMY M/L: CPT | Performed by: ORTHOPAEDIC SURGERY

## 2024-09-26 PROCEDURE — 3600000009 HC OR TIME - EACH INCREMENTAL 1 MINUTE - PROCEDURE LEVEL FOUR: Performed by: ORTHOPAEDIC SURGERY

## 2024-09-26 PROCEDURE — A29881 PR KNEE SCOPE,MED/LAT MENISECTOMY: Performed by: ANESTHESIOLOGY

## 2024-09-26 PROCEDURE — 7100000010 HC PHASE TWO TIME - EACH INCREMENTAL 1 MINUTE: Performed by: ORTHOPAEDIC SURGERY

## 2024-09-26 PROCEDURE — 7100000001 HC RECOVERY ROOM TIME - INITIAL BASE CHARGE: Performed by: ORTHOPAEDIC SURGERY

## 2024-09-26 PROCEDURE — 2500000001 HC RX 250 WO HCPCS SELF ADMINISTERED DRUGS (ALT 637 FOR MEDICARE OP): Performed by: ANESTHESIOLOGIST ASSISTANT

## 2024-09-26 PROCEDURE — 3700000002 HC GENERAL ANESTHESIA TIME - EACH INCREMENTAL 1 MINUTE: Performed by: ORTHOPAEDIC SURGERY

## 2024-09-26 PROCEDURE — 2500000004 HC RX 250 GENERAL PHARMACY W/ HCPCS (ALT 636 FOR OP/ED): Performed by: ANESTHESIOLOGIST ASSISTANT

## 2024-09-26 PROCEDURE — 2500000004 HC RX 250 GENERAL PHARMACY W/ HCPCS (ALT 636 FOR OP/ED): Performed by: ORTHOPAEDIC SURGERY

## 2024-09-26 PROCEDURE — 7100000009 HC PHASE TWO TIME - INITIAL BASE CHARGE: Performed by: ORTHOPAEDIC SURGERY

## 2024-09-26 RX ORDER — GABAPENTIN 300 MG/1
CAPSULE ORAL AS NEEDED
Status: DISCONTINUED | OUTPATIENT
Start: 2024-09-26 | End: 2024-09-26

## 2024-09-26 RX ORDER — SODIUM CHLORIDE, SODIUM LACTATE, POTASSIUM CHLORIDE, CALCIUM CHLORIDE 600; 310; 30; 20 MG/100ML; MG/100ML; MG/100ML; MG/100ML
INJECTION, SOLUTION INTRAVENOUS CONTINUOUS PRN
Status: DISCONTINUED | OUTPATIENT
Start: 2024-09-26 | End: 2024-09-26

## 2024-09-26 RX ORDER — HYDROCODONE BITARTRATE AND ACETAMINOPHEN 5; 325 MG/1; MG/1
1 TABLET ORAL EVERY 6 HOURS PRN
Qty: 15 TABLET | Refills: 0 | Status: SHIPPED | OUTPATIENT
Start: 2024-09-26

## 2024-09-26 RX ORDER — ACETAMINOPHEN 325 MG/1
TABLET ORAL AS NEEDED
Status: DISCONTINUED | OUTPATIENT
Start: 2024-09-26 | End: 2024-09-26

## 2024-09-26 RX ORDER — FENTANYL CITRATE 50 UG/ML
INJECTION, SOLUTION INTRAMUSCULAR; INTRAVENOUS AS NEEDED
Status: DISCONTINUED | OUTPATIENT
Start: 2024-09-26 | End: 2024-09-26

## 2024-09-26 RX ORDER — BUPIVACAINE HYDROCHLORIDE 2.5 MG/ML
INJECTION, SOLUTION EPIDURAL; INFILTRATION; INTRACAUDAL AS NEEDED
Status: DISCONTINUED | OUTPATIENT
Start: 2024-09-26 | End: 2024-09-26 | Stop reason: HOSPADM

## 2024-09-26 RX ORDER — EPINEPHRINE 1 MG/ML
INJECTION, SOLUTION, CONCENTRATE INTRAVENOUS AS NEEDED
Status: DISCONTINUED | OUTPATIENT
Start: 2024-09-26 | End: 2024-09-26 | Stop reason: HOSPADM

## 2024-09-26 RX ORDER — ALBUTEROL SULFATE 0.83 MG/ML
2.5 SOLUTION RESPIRATORY (INHALATION) ONCE AS NEEDED
Status: DISCONTINUED | OUTPATIENT
Start: 2024-09-26 | End: 2024-09-26 | Stop reason: HOSPADM

## 2024-09-26 RX ORDER — KETOROLAC TROMETHAMINE 30 MG/ML
INJECTION, SOLUTION INTRAMUSCULAR; INTRAVENOUS AS NEEDED
Status: DISCONTINUED | OUTPATIENT
Start: 2024-09-26 | End: 2024-09-26

## 2024-09-26 RX ORDER — ONDANSETRON 4 MG/1
4 TABLET, FILM COATED ORAL EVERY 8 HOURS PRN
Qty: 20 TABLET | Refills: 0 | Status: SHIPPED | OUTPATIENT
Start: 2024-09-26

## 2024-09-26 RX ORDER — LIDOCAINE HYDROCHLORIDE 10 MG/ML
0.1 INJECTION, SOLUTION EPIDURAL; INFILTRATION; INTRACAUDAL; PERINEURAL ONCE
Status: DISCONTINUED | OUTPATIENT
Start: 2024-09-26 | End: 2024-09-26 | Stop reason: HOSPADM

## 2024-09-26 RX ORDER — ONDANSETRON HYDROCHLORIDE 2 MG/ML
4 INJECTION, SOLUTION INTRAVENOUS ONCE AS NEEDED
Status: DISCONTINUED | OUTPATIENT
Start: 2024-09-26 | End: 2024-09-26 | Stop reason: HOSPADM

## 2024-09-26 RX ORDER — ONDANSETRON HYDROCHLORIDE 2 MG/ML
INJECTION, SOLUTION INTRAVENOUS AS NEEDED
Status: DISCONTINUED | OUTPATIENT
Start: 2024-09-26 | End: 2024-09-26

## 2024-09-26 RX ORDER — LIDOCAINE HYDROCHLORIDE 20 MG/ML
INJECTION, SOLUTION INFILTRATION; PERINEURAL AS NEEDED
Status: DISCONTINUED | OUTPATIENT
Start: 2024-09-26 | End: 2024-09-26

## 2024-09-26 RX ORDER — HYDROMORPHONE HYDROCHLORIDE 1 MG/ML
INJECTION, SOLUTION INTRAMUSCULAR; INTRAVENOUS; SUBCUTANEOUS AS NEEDED
Status: DISCONTINUED | OUTPATIENT
Start: 2024-09-26 | End: 2024-09-26

## 2024-09-26 RX ORDER — GLYCOPYRROLATE 0.2 MG/ML
INJECTION INTRAMUSCULAR; INTRAVENOUS AS NEEDED
Status: DISCONTINUED | OUTPATIENT
Start: 2024-09-26 | End: 2024-09-26

## 2024-09-26 RX ORDER — METOCLOPRAMIDE HYDROCHLORIDE 5 MG/ML
10 INJECTION INTRAMUSCULAR; INTRAVENOUS ONCE AS NEEDED
Status: DISCONTINUED | OUTPATIENT
Start: 2024-09-26 | End: 2024-09-26 | Stop reason: HOSPADM

## 2024-09-26 RX ORDER — ASPIRIN 325 MG
325 TABLET, DELAYED RELEASE (ENTERIC COATED) ORAL DAILY
Qty: 15 TABLET | Refills: 0 | Status: SHIPPED | OUTPATIENT
Start: 2024-09-27

## 2024-09-26 RX ORDER — MIDAZOLAM HYDROCHLORIDE 1 MG/ML
INJECTION, SOLUTION INTRAMUSCULAR; INTRAVENOUS AS NEEDED
Status: DISCONTINUED | OUTPATIENT
Start: 2024-09-26 | End: 2024-09-26

## 2024-09-26 RX ORDER — SODIUM CHLORIDE, SODIUM LACTATE, POTASSIUM CHLORIDE, AND CALCIUM CHLORIDE .6; .31; .03; .02 G/100ML; G/100ML; G/100ML; G/100ML
IRRIGANT IRRIGATION AS NEEDED
Status: DISCONTINUED | OUTPATIENT
Start: 2024-09-26 | End: 2024-09-26 | Stop reason: HOSPADM

## 2024-09-26 RX ORDER — CEFAZOLIN 1 G/1
INJECTION, POWDER, FOR SOLUTION INTRAVENOUS AS NEEDED
Status: DISCONTINUED | OUTPATIENT
Start: 2024-09-26 | End: 2024-09-26

## 2024-09-26 RX ORDER — DOCUSATE SODIUM 100 MG/1
100 CAPSULE, LIQUID FILLED ORAL 2 TIMES DAILY
Qty: 30 CAPSULE | Refills: 0 | Status: SHIPPED | OUTPATIENT
Start: 2024-09-26 | End: 2024-10-11

## 2024-09-26 RX ORDER — SODIUM CHLORIDE, SODIUM LACTATE, POTASSIUM CHLORIDE, CALCIUM CHLORIDE 600; 310; 30; 20 MG/100ML; MG/100ML; MG/100ML; MG/100ML
100 INJECTION, SOLUTION INTRAVENOUS CONTINUOUS
Status: DISCONTINUED | OUTPATIENT
Start: 2024-09-26 | End: 2024-09-26 | Stop reason: HOSPADM

## 2024-09-26 RX ORDER — OXYCODONE HYDROCHLORIDE 5 MG/1
5 TABLET ORAL EVERY 4 HOURS PRN
Status: DISCONTINUED | OUTPATIENT
Start: 2024-09-26 | End: 2024-09-26 | Stop reason: HOSPADM

## 2024-09-26 RX ORDER — HYDROMORPHONE HYDROCHLORIDE 0.2 MG/ML
0.2 INJECTION INTRAMUSCULAR; INTRAVENOUS; SUBCUTANEOUS EVERY 5 MIN PRN
Status: DISCONTINUED | OUTPATIENT
Start: 2024-09-26 | End: 2024-09-26 | Stop reason: HOSPADM

## 2024-09-26 RX ORDER — PROPOFOL 10 MG/ML
INJECTION, EMULSION INTRAVENOUS AS NEEDED
Status: DISCONTINUED | OUTPATIENT
Start: 2024-09-26 | End: 2024-09-26

## 2024-09-26 RX ADMIN — Medication 6 L/MIN: at 08:35

## 2024-09-26 SDOH — HEALTH STABILITY: MENTAL HEALTH: CURRENT SMOKER: 0

## 2024-09-26 ASSESSMENT — PAIN DESCRIPTION - DESCRIPTORS: DESCRIPTORS: DISCOMFORT

## 2024-09-26 ASSESSMENT — COLUMBIA-SUICIDE SEVERITY RATING SCALE - C-SSRS
2. HAVE YOU ACTUALLY HAD ANY THOUGHTS OF KILLING YOURSELF?: NO
1. IN THE PAST MONTH, HAVE YOU WISHED YOU WERE DEAD OR WISHED YOU COULD GO TO SLEEP AND NOT WAKE UP?: NO
6. HAVE YOU EVER DONE ANYTHING, STARTED TO DO ANYTHING, OR PREPARED TO DO ANYTHING TO END YOUR LIFE?: NO

## 2024-09-26 ASSESSMENT — PAIN - FUNCTIONAL ASSESSMENT
PAIN_FUNCTIONAL_ASSESSMENT: 0-10

## 2024-09-26 ASSESSMENT — PAIN SCALES - GENERAL
PAINLEVEL_OUTOF10: 4
PAINLEVEL_OUTOF10: 1
PAINLEVEL_OUTOF10: 0 - NO PAIN

## 2024-09-26 NOTE — OP NOTE
RIGHT Knee Partial Medial Meniscectomy (LMA) (R) Operative Note     Date: 2024  [place] Location: [facility]    Name: Marianne Huertas, : 1960, Age: 64 y.o., MRN: [id number], Sex: male    Diagnosis  Pre-op Diagnosis      * Complex tear of medial meniscus of right knee, subsequent encounter [S83.231D] Post-op Diagnosis     * Complex tear of medial meniscus of right knee, subsequent encounter [S83.231D]     Procedures  RIGHT Knee Partial Medial Meniscectomy (LMA)  87448 - [place] [facility] KNE SURG W/MENISCECTOMY MED/LAT W/SHVG      Surgeons      * Servando Tello - Primary    Resident/Fellow/Other Assistant:  Surgeons and Role:     * Mani Baker PA-C - ELISA First Assist    Procedure Summary  Anesthesia: General  ASA: III  Anesthesia Staff: Anesthesiologist: Shamar Toussaint MD  C-AA: DAYANNA Culp  Estimated Blood Loss: 5 mL  Intra-op Medications:   Administrations occurring from 0730 to 0845 on 24:   Medication Name Total Dose   lactated Ringer's irrigation solution 3,000 mL   bupivacaine PF (Marcaine) 0.25 % (2.5 mg/mL) injection 16 mL   EPINEPHrine HCl (PF) (Adrenalin) injection 1 mg   oxygen (O2) therapy 30 L          Anesthesia Record               Intraprocedure I/O Totals          Output    Urine 5 mL    Total Output 5 mL          Specimen: No specimens collected     Staff:   Circulator: Sol  Circulator: Alysha Warren Person: Yazmin     Drains and/or Catheters: None    Tourniquet Times:     Total Tourniquet Time Documented:  area (laterality) - 14 minutes  Total: area (laterality) - 14 minutes      Implants: None    Findings: Complex tear medial meniscus, focal grade 2 and grade 3 medial femoral condyle and patella chondral changes.    Indications: Marianne Huertas is an 64 y.o. male who is having surgery for Complex tear of medial meniscus of right knee, subsequent encounter [S83.231D].  Physical examination and MRI confirm the findings of a complex medial meniscus tear.  There are  early degenerative changes.  Right knee arthroscopy is indicated.    The patient was seen in the preoperative area. The risks, benefits, complications, treatment options, non-operative alternatives, expected recovery and outcomes were discussed with the patient. The possibilities of reaction to medication, pulmonary aspiration, injury to surrounding structures, bleeding, recurrent infection, the need for additional procedures, failure to diagnose a condition, and creating a complication requiring transfusion or operation were discussed with the patient. The patient concurred with the proposed plan, giving informed consent.  The site of surgery was properly noted/marked if necessary per policy. The patient has been actively warmed in preoperative area. Preoperative antibiotics have been ordered and given within 1 hours of incision. Venous thrombosis prophylaxis have been ordered including unilateral sequential compression device    Procedure Details: In the operative suite the patient was placed supine on the table.  An LMA was inserted by the anesthesiologist.  A right thigh tourniquet was placed.  The right lower extremity was prepped and draped in usual sterile fashion.  A timeout was performed and 3 g of Ancef were given prior to initiating the procedure.  The proposed arthroscopic portal sites were infiltrated with 0.25% Marcaine and epinephrine.  A standard 2 portal diagnostic arthroscopy technique was utilized.  The suprapatellar pouch and gutters were unremarkable and free of debris.  There was early fibrillation over the patella facets without unstable flap or full-thickness loss.  There was central grade 3 changes in the trochlea with unstable flaps which were carefully debrided.  No evidence of full-thickness loss.  The lateral compartment was entered with intact articular cartilage and stable meniscus when probed.  The notch was entered.  The ACL and PCL were intact.  The medial compartment was entered  with grade 2 changes over the posterior medial femoral condyle.  The anterior and posterior roots of the medial meniscus were inspected and intact.  There was an oblique unstable complex flap tear of the posterior medial meniscus that did not quite extend to the level of the capsule.  This was unstable and not amenable to repair.  Right knee arthroscopic partial medial meniscectomy was performed with a basket biter to remove the unstable flaps.  The meniscus was then contoured to a smooth margin with a curved 4 mm shaver.  The remaining meniscus was probed and stable with the debridement complete.  The knee was then lavaged and suctioned of debris.  Instruments were removed and fluid was expelled.  Portals were closed with nylon suture.  Xeroform sterile gauze dressing was applied followed by an Ace wrap.  All sponge counts and needle counts were correct.    Monty CALDERON was present for the entire case.  His assistance was necessary and critical to the successful completion of the procedure.  He provided skilled assistance with holding the arthroscope during key portions of the case as well as patient positioning and skin closure.      Complications:  None; patient tolerated the procedure well.    Disposition: PACU - hemodynamically stable.  Condition: stable   Additional Details: DVT prophylaxis includes aspirin and active ankle foot pumps    Attending Attestation: I performed the procedure.    Servando Tello  Phone Number: [phone]

## 2024-09-26 NOTE — INTERVAL H&P NOTE
H&P reviewed. The patient was examined and there are no changes to the H&P.    Bao Morley MD  Sports Medicine Fellow  Orthopaedic Surgery

## 2024-09-26 NOTE — BRIEF OP NOTE
Date: 2024  [place] [facility] [place] [facility]    Name: Marianne Huertas, : 1960, Age: 64 y.o., MRN: [id number], Sex: male    Diagnosis  Pre-op Diagnosis      * Complex tear of medial meniscus of right knee, subsequent encounter [S83231D] Post-op Diagnosis     * Complex tear of medial meniscus of right knee, subsequent encounter [J33231D]     Procedures  RIGHT Knee Partial Medial Meniscectomy (LMA)  37809 - [place] [facility] KNE SURG W/MENISCECTOMY MED/LAT W/SHVG      Surgeons      * Servando Tello - Primary    Resident/Fellow/Other Assistant:  Surgeons and Role:     * Mani Baker PA-C - ELISA First Assist    Procedure Summary  Anesthesia: General  ASA: III  Anesthesia Staff: Anesthesiologist: Shamar Toussaint MD  C-AA: DAYANNA Culp  Estimated Blood Loss: 5 mL  Intra-op Medications:   Administrations occurring from 0730 to 0845 on 24:   Medication Name Total Dose   lactated Ringer's irrigation solution 3,000 mL   bupivacaine PF (Marcaine) 0.25 % (2.5 mg/mL) injection 16 mL   EPINEPHrine HCl (PF) (Adrenalin) injection 1 mg   oxygen (O2) therapy 30 L          Anesthesia Record               Intraprocedure I/O Totals          Output    Urine 5 mL    Total Output 5 mL          Specimen: No specimens collected     Staff:   Circulator: Sol  Circulator: Alysha Warren Person: Yazmin    Findings: Complex tear medial meniscus, focal grade 2 and grade 3 medial femoral condyle and patella chondral changes.    Complications:  None; patient tolerated the procedure well.     Disposition: PACU - hemodynamically stable.  Condition: stable  Specimens Collected: No specimens collected  Attending Attestation: I performed the procedure.    Servando Tello  Phone Number: [phone]

## 2024-09-26 NOTE — ANESTHESIA PREPROCEDURE EVALUATION
Patient: Marianne Huertas    Procedure Information       Anesthesia Start Date/Time: 09/26/24 0750    Procedure: RIGHT Knee Partial Medial Meniscectomy (LMA) (Right: Knee) - [facility] from [facility]    Location: [facility] OR  / [facility] OR    Surgeons: Servando Tello MD            Relevant Problems   Anesthesia (within normal limits)      Cardiac   (+) Dyslipidemia, goal LDL below 100   (+) Essential hypertension      Pulmonary   (+) Obstructive sleep apnea on CPAP      Neuro   (+) CTS (carpal tunnel syndrome)   (+) Carpal tunnel syndrome   (+) Ulnar neuropathy at elbow      GI   (+) Esophageal reflux   (+) Hiatal hernia      /Renal   (+) Benign enlargement of prostate      Endocrine   (+) Class 2 severe obesity with serious comorbidity and body mass index (BMI) of 39.0 to 39.9 in adult      Musculoskeletal   (+) CTS (carpal tunnel syndrome)   (+) Carpal tunnel syndrome      HEENT   (+) Acute sinusitis      ID   (+) Herpes zoster   (+) URI (upper respiratory infection)       Clinical information reviewed:   Tobacco  Allergies  Meds   Med Hx  Surg Hx   Fam Hx  Soc Hx        NPO Detail:  NPO/Void Status  Date of Last Liquid: 09/25/24  Time of Last Liquid: 2359  Date of Last Solid: 09/25/24  Time of Last Solid: 2100  Last Intake Type: Clear fluids; Food  Time of Last Void: 0659         Physical Exam    Airway  Mallampati: III  TM distance: >3 FB  Neck ROM: full     Cardiovascular   Rhythm: regular  Rate: normal     Dental    Pulmonary   Breath sounds clear to auscultation     Abdominal      Other findings: Denies loose/chipped/cracked teeth         Anesthesia Plan    History of general anesthesia?: yes  History of complications of general anesthesia?: no    ASA 3     general   (NPO>MN, denies smoking, occ. ETOH, no illicit drug use)  The patient is not a current smoker.    intravenous induction   Anesthetic plan and risks discussed with patient.    Plan discussed with CAA and attending.

## 2024-09-26 NOTE — DISCHARGE INSTRUCTIONS
Servando Tello M.D.  Department of Orthopedics  [address] [place] [address]  Office: [phone]    POST OPERATIVE INSTRUCTIONS FOR KNEE ARTHROSCOPY      General Anesthesia or Sedation  You have been given medications that affect your balance and coordination for 24 hours.  Go directly home from the hospital and rest for the remainder of the day.  Have a responsible adult stay with you today and overnight.  Do not drive or operate equipment, conduct business or sign any legal documents for the next 24 hours or while taking pain medication.  Do not drink alcohol, take tranquilizers or sleeping pills for the next 24 hours or while taking pain medication.  Deep breathe and cough two times at least every 4 hours today and tomorrow while you are awake. This will help keep fevers away.   Use your CPAP or BiPAP machine whenever sleeping during the day or night.    Diet  Start a light meal and advance to your regular diet as tolerated    Dressing/Wound Care  Keep your dressing clean and dry.  You may remove your dressing in 2 days. Please remove the yellow strips as well.   You may see some spotting or drainage on your dressing, this is normal.  The purpose of the dressing is to apply pressure to the incision and to soak up any discharge or drainage from the incision.  Inspect the incision area for redness, swelling or drainage.  Leave Steri-Strips in place if present.  Apply band aids over incision.    Showering/Bathing  After the dressing has been removed, you may shower. Please cover the incisions with water proof band aids or a plastic wrap. Incisions should stay dry until sutures have been removed.  Allow soap and water to gently run over the operative area and pat dry when covered until incisions are fully healed.                 Activity and Exercise  Begin  Sports Medicine leg exercises (see instruction sheet) on post op day 1.  Your weight bearing status until your follow up  appointment is weight bearing as tolerated. Use crutches for 2 - 3 days and progress your weight bearing as you feel comfortable. Do not discontinue crutches until you are able to walk comfortably without a limp.     **Physical Therapy can start 2 weeks post op. Please schedule. A PT order will be provided at your first post op appointment.    Ice/Polar Care  Apply an ice pack every 2 hours for 20 - 30 minutes while awake for the first 2 - 3 days.  Then 3 - 5 times a day for 20 minutes until seen by your surgeon.  Never place an ice pack directly on skin.  Always have a barrier such as a towel between the ice pack and your skin.  Your Polar Care unit is to be work continuously for the first 2 days postoperatively, then 3 - 5 times daily for 30 minutes until seen by your surgeon.  Refill with ice and water as needed.    Elevation  Elevating your operative extremity will lessen swelling and discomfort.    Support Hose  Wear the support hose sent home with you at all times if provided with them.  Please take the additional hose with you to the Physical Therapist or Physician office to put on your surgical leg after the dressing is removed.  You may take the hose off to hand wash and air dry, do not place them in the washer or dryer.  You will need to wear the support hose until cleared by your physician.  Wearing compression stocking, using blood thinning medication (typically aspirin) and performing ankle pumps help to prevent blood clots!      Medications  Pain medications should be taken with food.  You may experience dizziness or drowsiness while taking your prescribed pain medication.   DO NOT DRIVE!  DO NOT DRINK ALCOHOL!  Pain medication can be constipating, drink plenty of fluids and increase your fiber intake to avoid this problem.  If you develop constipation, Colace is an over the counter stool softener you may use.  New Take Home Medications - Take as directed on bottle.    Resume your prescription  medications as directed by your physician.    Do not take other medications containing Tylenol while on your pain medications.    May have Tylenol after: 1:10pm    May have Ibuprofen/advil/motrin/aleve after: 2:30pm        When To Notify Your Physician  Persistent temperature greater than 101°F.  Increase or severe pain that does not respond to elevation, ice or pain medication.  Unexplained redness, swelling, numbness or tingling that does not improve with elevation or ice.  Increased amount or change in color of drainage.  Persistent nausea and vomiting.  Fingers and toes should remain pink and warm.  Notify your doctor if they become cool, grey or numb.  If you cannot reach your surgeon, seek care at an Urgent Care Center or Emergency Room.      For questions or concerns regarding your care please contact your surgeon      Dr. Servando Tello    [phone]   Monty Baker PA-C    [phone]   After hours and weekends   [phone]    Post Operative Appointment:  Please call [facility] at [phone] to schedule your first appointment with Monty Baker PA-C in 10-12 days if not previously done.    PLEASE NOTE:  Additional information and instruction will be provided by your physician regarding your surgical procedure and continued care at your initial post operative office appointment              Heel Slide:   If brace is on wait on this. Sitting, pull foot towards body.    Assist stretch with hands. Hold for 5 seconds, then bend a   little bit farther and hold for another 5 seconds then relax.  Repeat 15 times, 6 times per day.           Heel Prop:  Whenever sitting, place heel on a footrest. Heel must  be high enough so your calf and thigh are off the ground.      Towel/Cord Stretch-Toe pulls:       Sitting, wrap towel or cord around foot.  Pull   With one hand to raise foot.  Stabilize your leg  by placing your other hand on your thigh. Pull  on strap with thigh muscle relaxed for 5 seconds.  Then contract thigh  muscle while releasing cord  and hold heel up for 5 seconds. Repeat  sequence 10 times, 6 times per day.      Quad Sets:   Tighten thigh muscle while pushing your knee   down into the towel.  Hold for 5 seconds then rest   for 5 seconds and repeat.  Perform 10 times, 6   times per day.    Straight Leg Raise:          Sit with back supported, or lay down. Tighten front thigh muscle, pull toe   back toward you, then lift leg 8-10   inches off surface. Pause at the top and   lower   slowly to start position. Repeat 15   times, 6 times per day. Sometimes this is easier a week after surgery.       ** Extension Habit ** When rising to stand, shift weight to involved leg and tighten thigh muscle to lock out the knee.  Hold for 10 seconds.    ** Don't forget ankle pumps throughout the day as well!!

## 2024-09-26 NOTE — ANESTHESIA POSTPROCEDURE EVALUATION
Patient: Marianne Huertas    Procedure Summary       Date: 09/26/24 Room / Location: [facility] OR 01 / [facility] OR    Anesthesia Start: 0750 Anesthesia Stop: 0837    Procedure: RIGHT Knee Partial Medial Meniscectomy (LMA) (Right: Knee) Diagnosis:       Complex tear of medial meniscus of right knee, subsequent encounter      (Complex tear of medial meniscus of right knee, subsequent encounter [S83.567D])    Surgeons: Servando Tello MD Responsible Provider: Shamar Toussaint MD    Anesthesia Type: general ASA Status: 3            Anesthesia Type: general    Vitals Value Taken Time   /67 09/26/24 0905   Temp 36 °C (96.8 °F) 09/26/24 0837   Pulse 61 09/26/24 0905   Resp 16 09/26/24 0905   SpO2 93 % 09/26/24 0905       Anesthesia Post Evaluation    Patient location during evaluation: PACU  Patient participation: complete - patient participated  Level of consciousness: awake and alert  Pain management: satisfactory to patient  Multimodal analgesia pain management approach  Airway patency: patent  Cardiovascular status: acceptable  Respiratory status: acceptable  Hydration status: acceptable  Postoperative Nausea and Vomiting: none  Comments: Did very well    There were no known notable events for this encounter.

## 2024-09-26 NOTE — ANESTHESIA PROCEDURE NOTES
Airway  Date/Time: 9/26/2024 7:55 AM  Urgency: elective    Airway not difficult    Staffing  Performed: DAYANNA   Authorized by: Shamar Toussaint MD    Performed by: DAYANNA Culp  Patient location during procedure: OR    Indications and Patient Condition  Spontaneous ventilation: present  Sedation level: deep  Preoxygenated: yes  Mask difficulty assessment: 0 - not attempted  Planned trial extubation    Final Airway Details  Final airway type: supraglottic airway      Successful airway: Size 5     Number of attempts at approach: 1  Number of other approaches attempted: 0    Additional Comments  Lips/teeth in pre-anesthetic condition.

## 2024-10-07 ENCOUNTER — OFFICE VISIT (OUTPATIENT)
Dept: ORTHOPEDIC SURGERY | Facility: CLINIC | Age: 64
End: 2024-10-07
Payer: COMMERCIAL

## 2024-10-07 DIAGNOSIS — S83.231D COMPLEX TEAR OF MEDIAL MENISCUS OF RIGHT KNEE AS CURRENT INJURY, SUBSEQUENT ENCOUNTER: Primary | ICD-10-CM

## 2024-10-07 PROBLEM — S83.203A COMPLEX TEAR OF MENISCUS OF RIGHT KNEE AS CURRENT INJURY: Status: ACTIVE | Noted: 2024-09-19

## 2024-10-07 PROCEDURE — 1036F TOBACCO NON-USER: CPT | Performed by: PHYSICIAN ASSISTANT

## 2024-10-07 PROCEDURE — 99211 OFF/OP EST MAY X REQ PHY/QHP: CPT | Performed by: PHYSICIAN ASSISTANT

## 2024-10-07 ASSESSMENT — PAIN SCALES - GENERAL: PAINLEVEL_OUTOF10: 3

## 2024-10-07 ASSESSMENT — PAIN - FUNCTIONAL ASSESSMENT: PAIN_FUNCTIONAL_ASSESSMENT: 0-10

## 2024-10-10 ENCOUNTER — EVALUATION (OUTPATIENT)
Dept: PHYSICAL THERAPY | Facility: CLINIC | Age: 64
End: 2024-10-10
Payer: COMMERCIAL

## 2024-10-10 DIAGNOSIS — G89.29 CHRONIC PAIN OF RIGHT KNEE: Primary | ICD-10-CM

## 2024-10-10 DIAGNOSIS — M25.561 CHRONIC PAIN OF RIGHT KNEE: Primary | ICD-10-CM

## 2024-10-10 DIAGNOSIS — S83.231D COMPLEX TEAR OF MEDIAL MENISCUS OF RIGHT KNEE AS CURRENT INJURY, SUBSEQUENT ENCOUNTER: ICD-10-CM

## 2024-10-10 PROCEDURE — 97161 PT EVAL LOW COMPLEX 20 MIN: CPT | Mod: GP

## 2024-10-10 PROCEDURE — 97110 THERAPEUTIC EXERCISES: CPT | Mod: GP

## 2024-10-10 NOTE — PROGRESS NOTES
"Physical Therapy     Physical Therapy Evaluation        Patient Name: Marianne Huertas  MRN: [id number]  Today's Date: 10/10/2024  Time Calculation  Start Time: 1115  Stop Time: 1204  Time Calculation (min): 49 min    Reason for Visit  Referred by: KVNG Black  Referral DX: right knee partial medial menisectomy    Insurance:  Visit: #1  Authorized: 20  Payor: MEDICAL [facility] / Plan: [facility] HMO / Product Type: *No Product type* /     Diagnosis:  1. Chronic pain of right knee    2. Complex tear of medial meniscus of right knee as current injury, subsequent encounter            Subjective:  Date of Onset: 9-26-24  Chief Complaint: right knee pain/stiffness  AGUILA: gradual onset right knee pain  HX: \"I started to develop pain in my right knee over a few months. I was getting gel injections but it was bothering me more after a trip to [place]. The pain got really bad and I could hardly walk across the room. I saw Dr. Tello and had the surgery. I had the left knee with a similar surgery in Feb of 2023. He said the left knee had a little arthritis but the meniscus tear was larger on the right side. I'm having trouble bending my knee. I have been using a cane to get around.\"     Prior level of function: no prior functional limitations   Functional limitations: prolonged WB, stairs, squatting  Home environment: stairs  Work status/occupation: consultant  Recreational activity: swimming, elliptical     Patient stated goals for treatment: reduce pain and improve     Reviewed medical screening history form with patient: completed 10-10-24      Precautions: no fall risk         Pain:  Location: right knee  Nature: ache  Current pain: 1/10; Range: 1-7/10  Aggravating factor: bending, twisting, descending stairs   Alleviating factor: rest    Objective:  Observation/Posture: right knee swelling noted     AROM: Right knee: 0 - 5 - 62   Left knee: 0 - 130    PROM/mobility: restricted R knee flexion > " ext    MMT: SLR: RLE (+) lag with repetitions; decreased quad set RLE    Flexibility: Sotero: (+) RLE    Balance: Unable to SLS on RLE due to knee pain/swelling    Gait: Ambulates with contralateral std cane with step to gait pattern    Outcome Measure:   Other Measures  Lower Extremity Funtional Score (LEFS): 30       Treatment:  Educated pt on course of therapy  Sup AROM heel slide x 10   Sup gastroc stretch x 60 sec x 3  Sup quad set with towel roll x 10  Sup Sotero test stretch x 60 sec x 3  Sup SLR x 10     OP Education: HEP: #2-6    Charges: Eval, TE x 1    Assessment:   Pt presents with s/p Right knee partial medial menisectomy. Demonstrates limited right knee ROM, quad inhibition and overall limited function. Pt will benefit from therapy to address deficits.     Plan:  Frequency/duration: 1x/wk for 6-8 weeks  Planned interventions: Therapeutic exercise (ROM, stretching, strengthening), manual therapy, balance/gait training, education and activity modification  Rehab Potential to achieve goals: good     Goals:  Improve LEFS > 9 by 8 weeks  Improve AROM right knee flex to 120 degrees to allow pt to squat to reach the ground by 6 weeks  Improve right quad strength to SLR x reps without lag to allow reciprocal stair negotiation over 10 stairs by 6 weeks  Resume recreational exercise without restriction by 8 weeks    Julio C Brewster, PT

## 2024-10-15 ENCOUNTER — TREATMENT (OUTPATIENT)
Dept: PHYSICAL THERAPY | Facility: CLINIC | Age: 64
End: 2024-10-15
Payer: COMMERCIAL

## 2024-10-15 DIAGNOSIS — M25.561 CHRONIC PAIN OF RIGHT KNEE: Primary | ICD-10-CM

## 2024-10-15 DIAGNOSIS — G89.29 CHRONIC PAIN OF RIGHT KNEE: Primary | ICD-10-CM

## 2024-10-15 PROCEDURE — 97110 THERAPEUTIC EXERCISES: CPT | Mod: GP

## 2024-10-15 NOTE — PROGRESS NOTES
"Physical Therapy    Physical Therapy Treatment    Patient Name: Marianne Huertas  MRN: [id number]  Today's Date: 10/15/2024  Time Calculation  Start Time: 1031  Stop Time: 1122  Time Calculation (min): 51 min        Insurance:  Visit: #2  Authorized: 20  Payor: MEDICAL [facility] / Plan: [facility] HMO / Product Type: *No Product type* /     Subjective:  \"I feel like I'm walking better. I'm still using the cane sometimes. The exercises feel pretty good.\"     Current pain: 0/10; Range: 0-3/10    Objective:  AROM: right knee: 0 - 2 - 86    Treatment:  Rec bike x 5 min  Seated hamstring stretch x 60 sec x 2  Standing quad stretch x 60 sec x 2  Standing weight shift with contralateral toe tap to 4\" stair x 10   SLS with contralateral LE reaching x 10   2\" lateral step down x 10   Total gym leg press with angle 4 BLE 3 x 20    OP Education: HEP: #3, 5    Charges: TE x 3      Diagnosis:  1. Chronic pain of right knee          Assessment:   Pt demonstrates increased right knee ROM following session. Pt tolerated WB there ex without sx increase.     Plan:  Continue with focus on restoring knee ROM and LE strength.    Julio C Brewster, PT  "

## 2024-10-16 ENCOUNTER — TELEPHONE (OUTPATIENT)
Dept: PRIMARY CARE | Facility: CLINIC | Age: 64
End: 2024-10-16
Payer: COMMERCIAL

## 2024-10-21 ENCOUNTER — TREATMENT (OUTPATIENT)
Dept: PHYSICAL THERAPY | Facility: CLINIC | Age: 64
End: 2024-10-21
Payer: COMMERCIAL

## 2024-10-21 DIAGNOSIS — G89.29 CHRONIC PAIN OF RIGHT KNEE: Primary | ICD-10-CM

## 2024-10-21 DIAGNOSIS — M25.561 CHRONIC PAIN OF RIGHT KNEE: Primary | ICD-10-CM

## 2024-10-21 PROCEDURE — 97110 THERAPEUTIC EXERCISES: CPT | Mod: GP

## 2024-10-21 NOTE — PROGRESS NOTES
"Physical Therapy    Physical Therapy Treatment    Patient Name: Marianne Huertas  MRN: [id number]  Today's Date: 10/21/2024  Time Calculation  Start Time: 1417  Stop Time: 1459  Time Calculation (min): 42 min        Insurance:  Visit: #3  Authorized: 20  Payor: MEDICAL [facility] / Plan: MEDICAL [facility] HMO / Product Type: *No Product type* /     Subjective:  \"I'm walking farther than before. I'm up to about 2 blocks. I'm also able to go up stairs normally step over step.\"     Current pain: 0/10; Range: 0-3/10    Objective:  AROM: right knee: 0 - 2 - 108    Treatment:  Rec bike x 5 min  Seated hamstring stretch x 60 sec  Standing quad stretch x 60 sec  Standing weight shift with contralateral toe tap to 6\" stair x 10   SLS with contralateral LE reaching x 10   Total gym leg press with angle 5 BLE 3 x 20    OP Education: HEP: reviewed     Charges: TE x 3      Diagnosis:  1. Chronic pain of right knee            Assessment:   Pt demonstrates increased right knee since last therapy visit. Pt tolerated WB there ex without sx increase.     Plan:  Continue with focus on restoring knee ROM and LE strength.    Julio C Brewster, PT  "

## 2024-11-04 ENCOUNTER — OFFICE VISIT (OUTPATIENT)
Dept: ORTHOPEDIC SURGERY | Facility: CLINIC | Age: 64
End: 2024-11-04
Payer: COMMERCIAL

## 2024-11-04 ENCOUNTER — TREATMENT (OUTPATIENT)
Dept: PHYSICAL THERAPY | Facility: CLINIC | Age: 64
End: 2024-11-04
Payer: COMMERCIAL

## 2024-11-04 DIAGNOSIS — M17.11 PRIMARY OSTEOARTHRITIS OF RIGHT KNEE: Primary | ICD-10-CM

## 2024-11-04 DIAGNOSIS — S83.231D COMPLEX TEAR OF MEDIAL MENISCUS OF RIGHT KNEE AS CURRENT INJURY, SUBSEQUENT ENCOUNTER: ICD-10-CM

## 2024-11-04 DIAGNOSIS — G89.29 CHRONIC PAIN OF RIGHT KNEE: Primary | ICD-10-CM

## 2024-11-04 DIAGNOSIS — M25.561 CHRONIC PAIN OF RIGHT KNEE: Primary | ICD-10-CM

## 2024-11-04 PROCEDURE — 99211 OFF/OP EST MAY X REQ PHY/QHP: CPT | Performed by: PHYSICIAN ASSISTANT

## 2024-11-04 PROCEDURE — 97110 THERAPEUTIC EXERCISES: CPT | Mod: GP

## 2024-11-04 NOTE — PROGRESS NOTES
Subjective    Patient ID: Marianne Huertas is a 64 y.o. male.    Procedure: Right knee arthroscopic partial medial meniscectomy  Date of surgery: 9/26/2024      HPI:  Marianne Huertas is a 64 y.o. male who is status post 6 weeks right knee arthroscopic partial medial meniscectomy.  He still continues have some occasional medial sided knee pain.  He does report stiffness but that is slowly improving.  He has been participating in physical therapy.  He has been using prescription strength Motrin on a daily basis but unfortunately this is started to cause some side effects.  He has since discontinued the medication.    ROS  Constitutional: No fever, no chills, not feeling tired, no recent weight gain and no recent weight loss  ENT: No nosebleeds  Cardiovascular: No chest pain  Respiratory: No shortness of breath and no cough  Gastrointestinal: No abdominal pain, no nausea, no diarrhea, and no vomiting  Musculoskeletal: No arthralgias  Integumentary: No rashes and no skin lesions  Neurological: No headache  Psychiatric: No sleep disturbances no depression  Endocrine: No muscle weakness and no muscle cramps  Hematologic/lymphatic: No swelling glands and no tendency for easy bruising      Objective   64-year-old male well appearing in no acute distress. Alert and oriented ×3.  Skin intact bilateral lower extremities.   Normal tandem gait. Coordination and balance intact.  Bilateral lower extremity compartments supple.  5 out of 5 distal motor strength bilaterally.  L4 through S1 sensation intact bilaterally.  2+ DP/PT pulses bilaterally.  Right knee incisions are healing nicely with minimal scarring.  No joint effusion.  Active range of motion 0 to 120 degrees with mild crepitus.  Some mild tenderness on the medial joint line.      Assessment/Plan   Encounter Diagnoses:  Primary osteoarthritis of right knee    Complex tear of medial meniscus of right knee as current injury, subsequent encounter    No orders of the  defined types were placed in this encounter.      His operative note was reviewed with him.  At the time of surgery was found that he had degenerative/arthritic changes present in the trochlea and over the medial femoral condyle.  This is contributing to his continued level of discomfort.  Since he is having side effects with anti-inflammatory medication, is continuing to have discomfort, and has degenerative changes seen at the time of surgery were going to proceed with viscosupplementation for him.  He did quite well with these when we did them on his left knee.  He is going to finish with physical therapy.  He can use Tylenol if needed and ice as well.  He will call with any questions or concerns.    This office note was dictated using Dragon voice to text software and was not proofread for spelling or grammatical errors

## 2024-11-04 NOTE — PROGRESS NOTES
"Physical Therapy    Physical Therapy Treatment    Patient Name: Marianne Huertas  MRN: [id number]  Today's Date: 11/4/2024  Time Calculation  Start Time: 1411  Stop Time: 1455  Time Calculation (min): 44 min        Insurance:  Visit: #4  Authorized: 20  Payor: MEDICAL [facility] / Plan: [facility] HMO / Product Type: *No Product type* /     Subjective:  \"The trip was a little taxing and I decided to come back a day early, but it the last 3 or 4 days the swelling is almost completely gone. I notice I can bend it better. I'm completely off the Ibuprofen.\"     Current pain: 0/10; Range: 0-3/10    Objective:  AROM: right knee: 0 - 2 - 118    Treatment:  Rec bike x 6 min  Total gym leg press with angle 6 BLE 3 x 20   SLS with balance foam x 30 sec x 3  SLS with contralateral LE reaching x 10   2\" lateral step down x 10   Standing weight shift with contralateral toe tap to 6\" stair x 10   Lateral stepping x 10' x 4   Standing mini squat x 10     OP Education: HEP: #3, 5    Charges: TE x 3      Diagnosis:  1. Chronic pain of right knee              Assessment:   Pt demonstrates increased right knee ROM since initial therapy visit. Pt tolerated WB there ex without sx increase.     Plan:  Continue with focus on restoring knee ROM and LE strength.    Julio C Brewster, PT  "

## 2024-11-06 ENCOUNTER — APPOINTMENT (OUTPATIENT)
Dept: PRIMARY CARE | Facility: CLINIC | Age: 64
End: 2024-11-06
Payer: COMMERCIAL

## 2024-11-06 VITALS — SYSTOLIC BLOOD PRESSURE: 142 MMHG | DIASTOLIC BLOOD PRESSURE: 78 MMHG | HEART RATE: 83 BPM

## 2024-11-06 DIAGNOSIS — I10 ESSENTIAL HYPERTENSION: Primary | ICD-10-CM

## 2024-11-06 ASSESSMENT — ENCOUNTER SYMPTOMS
HEADACHES: 1
HYPERTENSION: 1

## 2024-11-06 NOTE — PROGRESS NOTES
Hypertension/Blood Pressure Monitor Validation Initial Visit  Pharmacist Clinic: Hypertension Management    Marianne Huertas was referred to the Clinical Pharmacy Team for his blood pressure management.    Referring Provider: Dylon Barnett MD     Subjective     Hypertension  This is a chronic problem. The current episode started more than 1 year ago. Associated symptoms include headaches. There are no associated agents to hypertension. Risk factors for coronary artery disease include dyslipidemia, male gender and obesity.     Patient last saw Dr. Barnett in-office on 07/23/24. His in-office BP at that time was 138/88 mmHg and was referred to the clinical pharmacy team for validation of his home blood pressure monitor.     Past Medical History:  He has a past medical history of Allergies, Body mass index (BMI) 36.0-36.9, adult (09/15/2018), Chest pain, unspecified (11/09/2018), Cough, unspecified (01/10/2014), Elevated blood-pressure reading, without diagnosis of hypertension (07/21/2015), Fracture of left shoulder girdle, part unspecified, initial encounter for closed fracture (02/28/2015), GERD (gastroesophageal reflux disease), Male erectile dysfunction, unspecified (11/08/2018), Obstructive sleep apnea (adult) (pediatric) (03/16/2019), Other abnormal glucose (09/27/2021), Other general symptoms and signs (03/21/2016), Pain in left shoulder (12/01/2014), Pain in right shoulder (12/01/2014), Personal history of other diseases of male genital organs (03/31/2022), Personal history of other diseases of the digestive system (07/21/2015), Personal history of other diseases of the digestive system (10/21/2015), Personal history of other diseases of the nervous system and sense organs (02/28/2015), Personal history of other diseases of the nervous system and sense organs (08/19/2014), Personal history of other diseases of the nervous system and sense organs (03/16/2019), Personal history of other diseases of the  respiratory system (09/13/2014), Personal history of other diseases of the respiratory system (03/21/2016), Personal history of other endocrine, nutritional and metabolic disease (01/18/2018), Personal history of other specified conditions (07/30/2020), Personal history of other specified conditions (02/16/2018), Personal history of other specified conditions (01/09/2014), Umbilical hernia with obstruction, without gangrene (08/19/2015), Unspecified abdominal pain (07/21/2015), Unspecified abdominal pain (09/27/2021), Unspecified abdominal pain (09/27/2021), and Unspecified dacryocystitis of right lacrimal passage (02/13/2016).    Past Surgical History:  He has a past surgical history that includes Colonoscopy (12/13/2013); Cystoscopy (12/13/2013); Other surgical history (12/13/2013); Umbilical hernia repair (11/17/2015); Esophagogastroduodenoscopy (05/23/2017); Other surgical history (05/24/2021); Knee arthroscopy w/ meniscal repair; Knee surgery (Left); and Knee arthroscopy w/ partial medial meniscectomy (Right, 09/26/2024).    Social History:  He reports that he has never smoked. He has never used smokeless tobacco. He reports current alcohol use of about 2.0 standard drinks of alcohol per week. He reports that he does not use drugs.    Family History:  Family History   Problem Relation Name Age of Onset    Cancer Mother      Heart disease Mother      Kidney cancer Father      Heart disease Father         Allergies:  Cat dander and House dust mite    HYPERTENSION ASSESSMENT  Medication Therapy  Current Medications:   Doxazosin 4 mg once daily at bedtime (also for BPH)    Previous Medications:   Alfuzosin (states switched to Doxazosin to also help with BPH)     Adverse Effects: none reported      Home BP Monitoring  BP Cuff Type:   ReliOn BP Montior - unsure of when purchased   Omron Series 3 purchased within the past month  Cuff Validated? No, here for validation today     Current home BP readings:   132/75    130/83  130/80  122/83  123/80  120/76  135/85  136/78  123/73  128/82       BP Readings from Last 3 Encounters:   11/06/24 142/78   09/26/24 116/69   07/23/24 138/88       Lifestyle  Diet: 2-3 meals per day, will skip either lunch or breakfast  Breakfast: poached egg on spinach greens, yogurt with lokesh and flaxseed and granola, sometimes will have a piece of toast with cheese; 1 cup of coffee if at home per morning, when traveling 2-3 cups of coffee   Lunch: varies, sometimes will have a frozen meal from  [employer], sometimes a salad   Dinner: mix of things, if makes dinner is usually lighter - fish or chicken with a vegetable and carb; eats out 3 meals per week   Snacks: does not typically snack   Drinks: flavored carbonated water, water with lemon, sometimes a light beer   Sodium intake: may add salt to olive oil with bread and will add some salt to that; very rarely has canned foods, will typically do frozen veggies   Physical Activity:   Had knee surgery 6 weeks ago so exercise has been limited  Prior to surgery would use the elliptical every other day   When traveling swims at the pool at [employer]  Just joined a rec center recently       Risk Assessment  Major Risk Factors Include:  Hypertension  Obesity (BMI >30)  Dyslipidemia  Age > 55 (men) or > 65 (women)  Sleep apnea  The 10-year ASCVD risk score (Ish KNOX, et al., 2019) is: 9.8%    Values used to calculate the score:      Age: 64 years      Sex: Male      Is Non- : No      Diabetic: No      Tobacco smoker: No      Systolic Blood Pressure: 142 mmHg      Is BP treated: No      HDL Cholesterol: 62 mg/dL      Total Cholesterol: 184 mg/dL  Known target organ damage:  None    Primary Prevention  On Statin?: No, most recent lipid panel was in good range with LDL of 95 mg/dL and TG of 137 mg/dL on 11/13/23  On Aspirin?: Yes, 325 mg once daily   Immunizations Needed: All up-to-date and documented  Tobacco Use: non-smoker     Objective      BP Readings from Last 6 Encounters:   11/06/24 142/78   09/26/24 116/69   07/23/24 138/88   06/03/24 114/76   12/05/23 125/85   11/14/23 130/88     Wt Readings from Last 6 Encounters:   09/26/24 124 kg (274 lb 4 oz)   09/16/24 122 kg (268 lb)   07/23/24 124 kg (273 lb 9.6 oz)   06/03/24 123 kg (271 lb 6.2 oz)   12/05/23 122 kg (270 lb)   11/14/23 125 kg (276 lb)     Vitals:    11/06/24 0120 11/06/24 1325 11/06/24 1330 11/06/24 1335   BP: 132/82 123/76 137/84 142/78   BP Location: Right arm Right arm Left arm Left arm   Patient Position: Sitting Sitting Sitting Sitting   BP Cuff Size: Adult Adult Adult Adult   Pulse: 79 79 82 83       RELEVANT LAB RESULTS  Lab Results   Component Value Date    BILITOT 1.2 02/12/2018    CALCIUM 9.1 05/30/2024    CO2 25 05/30/2024     05/30/2024    CREATININE 1.02 08/08/2024    GLUCOSE 97 05/30/2024    ALKPHOS 50 02/12/2018    K 4.1 05/30/2024    PROT 7.2 02/12/2018     05/30/2024    AST 14 02/12/2018    ALT 21 11/13/2023    BUN 22 05/30/2024    ANIONGAP 13 05/30/2024    ALBUMIN 4.6 02/12/2018    GFRMALE 87 12/29/2022     Lab Results   Component Value Date    TRIG 137 11/13/2023    CHOL 184 11/13/2023    LDLCALC 95 11/13/2023    HDL 62.0 11/13/2023     Lab Results   Component Value Date    HGBA1C 5.9 (H) 05/30/2024     The 10-year ASCVD risk score (Ish KNOX, et al., 2019) is: 9.8%    Values used to calculate the score:      Age: 64 years      Sex: Male      Is Non- : No      Diabetic: No      Tobacco smoker: No      Systolic Blood Pressure: 142 mmHg      Is BP treated: No      HDL Cholesterol: 62 mg/dL      Total Cholesterol: 184 mg/dL    DRUG INTERACTIONS  Doxazosin x Sildenafil: PDE5 inhibitors can increase the hypotensive effects of doxazosin, monitor     DEVICE ACCURACY TEST   Care team should take five blood pressure readings using a combination of the patient's SMBP device and the office's method of blood pressure measurement.    Omron  Series 3:   STEP 1:              A Patient's Monitor right arm 130/87 mmHg HR 85   B Patient's Monitor right arm 132/82 mmHg HR 79   C Office's Monitor right arm 128/78 mmHg HR 82   D Patient's Monitor right arm 123/76 mmHg HR 79   E Office's Monitor right arm Not needed      STEP 2:  Part 1: Average measurements B and D   Part 2: Compare average of B and D to measurement C   Part 3: If the difference is …  --> Less than 5 mm Hg, this device can be used for SMBP  --> Between 6 and 10 mm Hg, proceed to Step 3  --> Greater than 10 mm Hg, replace the device before proceeding with your SMBP program  STEP 3:  Part 1: Average measurements C and E   Part 2: Compare average of C and E to measurement D   Part 3: If the difference is …  --> Less than or equal to 10 mm Hg, this device can be used for SMBP  --> Greater than 10 mm Hg, replace the device before proceeding with your SMBP program    CONCLUSION: This BP monitor is acceptable for home BP monitoring.      ReliOn Monitor  STEP 1:              A Patient's Monitor left arm 144/85 mmHg HR 84   B Patient's Monitor left arm 137/84 mmHg HR 82   C Office's Monitor left arm 142/80 mmHg HR 80   D Patient's Monitor left arm 142/78 mmHg HR 83   E Office's Monitor left arm Not needed     STEP 2:  Part 1: Average measurements B and D   Part 2: Compare average of B and D to measurement C   Part 3: If the difference is …  --> Less than 5 mm Hg, this device can be used for SMBP  --> Between 6 and 10 mm Hg, proceed to Step 3  --> Greater than 10 mm Hg, replace the device before proceeding with your SMBP program  STEP 3:  Part 1: Average measurements C and E   Part 2: Compare average of C and E to measurement D   Part 3: If the difference is …  --> Less than or equal to 10 mm Hg, this device can be used for SMBP  --> Greater than 10 mm Hg, replace the device before proceeding with your SMBP program    CONCLUSION: This BP monitor IS acceptable for home BP monitoring.      Assessment/Plan    Problem List Items Addressed This Visit       Essential hypertension - Primary     ASSESSMENT OF SMBP MEASUREMENTS  Highest readin/78 mmHg  Lowest readin/76 mmHg  Average: 128/80 mmHg    Patient's goal BP < 130/80.   Rationale for plan:   Patient's BP in-office today was slightly elevated, however, readings at home were in better range   Both monitors were validated for home use, he will keep one [place] for him to use when he is traveling  He will continue to monitor his blood pressure over the next month and we will follow-up to see where his readings at home are over that time period     Medication Changes:  Continue  Doxazosin 4 mg 1 tablet by mouth once daily at bedtime     Future Considerations:  If further blood pressure lowering is required could use add on either ACE-inhibitors, ARBs, calcium channel blockers, or thiazide diuretics     Monitoring and Education Discussed:  Monitor your blood pressure: Patient instructed to aim to check blood pressure once daily either prior to drinking coffee or late enough after coffee that it is no longer affecting his blood pressure. He was also instructed to sit and relax 5-10 minutes prior to checking his first reading, obtain first reading, then wait 5-10 minutes again and then obtain a second reading.   He did ask if blood pressure could differ in the different arms, he was informed that minor differences in blood pressure can be normal when comparing the right and left arm. He was instructed to use his left arm to check at home to keep it consistent.     We will follow-up in 4 weeks to go over his blood pressure log to ensure his readings at home are still in good range. He was encouraged to reach out with any questions and/or concerns prior to our next encounter.          Relevant Orders    Referral to Clinical Pharmacy     Pharmacy Follow Up: 2024    PCP Follow Up: 2024    Chadwick Garvey, PharmD    Continue all meds under  the continuation of care with the referring provider and clinical pharmacy team.

## 2024-11-06 NOTE — ASSESSMENT & PLAN NOTE
ASSESSMENT OF SMBP MEASUREMENTS  Highest readin/78 mmHg  Lowest readin/76 mmHg  Average: 128/80 mmHg    Patient's goal BP < 130/80.   Rationale for plan:   Patient's BP in-office today was slightly elevated, however, readings at home were in better range   Both monitors were validated for home use, he will keep one [place] for him to use when he is traveling  He will continue to monitor his blood pressure over the next month and we will follow-up to see where his readings at home are over that time period     Medication Changes:  Continue  Doxazosin 4 mg 1 tablet by mouth once daily at bedtime     Future Considerations:  If further blood pressure lowering is required could use add on either ACE-inhibitors, ARBs, calcium channel blockers, or thiazide diuretics     Monitoring and Education Discussed:  Monitor your blood pressure: Patient instructed to aim to check blood pressure once daily either prior to drinking coffee or late enough after coffee that it is no longer affecting his blood pressure. He was also instructed to sit and relax 5-10 minutes prior to checking his first reading, obtain first reading, then wait 5-10 minutes again and then obtain a second reading.   He did ask if blood pressure could differ in the different arms, he was informed that minor differences in blood pressure can be normal when comparing the right and left arm. He was instructed to use his left arm to check at home to keep it consistent.     We will follow-up in 4 weeks to go over his blood pressure log to ensure his readings at home are still in good range. He was encouraged to reach out with any questions and/or concerns prior to our next encounter.

## 2024-11-08 ENCOUNTER — OFFICE VISIT (OUTPATIENT)
Dept: PRIMARY CARE | Facility: CLINIC | Age: 64
End: 2024-11-08
Payer: COMMERCIAL

## 2024-11-08 VITALS
BODY MASS INDEX: 37.69 KG/M2 | HEIGHT: 71 IN | HEART RATE: 74 BPM | WEIGHT: 269.2 LBS | OXYGEN SATURATION: 95 % | DIASTOLIC BLOOD PRESSURE: 86 MMHG | SYSTOLIC BLOOD PRESSURE: 136 MMHG | TEMPERATURE: 97.2 F

## 2024-11-08 DIAGNOSIS — Z98.890 H/O ARTHROSCOPY OF LEFT KNEE: ICD-10-CM

## 2024-11-08 DIAGNOSIS — H93.13 TINNITUS OF BOTH EARS: ICD-10-CM

## 2024-11-08 DIAGNOSIS — R73.03 PREDIABETES: ICD-10-CM

## 2024-11-08 DIAGNOSIS — G47.33 OBSTRUCTIVE SLEEP APNEA ON CPAP: ICD-10-CM

## 2024-11-08 DIAGNOSIS — N40.1 BENIGN PROSTATIC HYPERPLASIA WITH NOCTURIA: ICD-10-CM

## 2024-11-08 DIAGNOSIS — Z98.890 H/O ARTHROSCOPY OF RIGHT KNEE: ICD-10-CM

## 2024-11-08 DIAGNOSIS — J30.1 SEASONAL ALLERGIC RHINITIS DUE TO POLLEN: ICD-10-CM

## 2024-11-08 DIAGNOSIS — J30.81 ALLERGIC RHINITIS DUE TO CATS: ICD-10-CM

## 2024-11-08 DIAGNOSIS — R35.1 BENIGN PROSTATIC HYPERPLASIA WITH NOCTURIA: ICD-10-CM

## 2024-11-08 DIAGNOSIS — M17.11 PRIMARY OSTEOARTHRITIS OF RIGHT KNEE: ICD-10-CM

## 2024-11-08 DIAGNOSIS — N52.9 ERECTILE DISORDER: ICD-10-CM

## 2024-11-08 DIAGNOSIS — R51.9 INCREASED FREQUENCY OF HEADACHES: Primary | ICD-10-CM

## 2024-11-08 DIAGNOSIS — J30.89 ALLERGIC RHINITIS DUE TO HOUSE DUST MITE: ICD-10-CM

## 2024-11-08 PROCEDURE — 99214 OFFICE O/P EST MOD 30 MIN: CPT | Performed by: INTERNAL MEDICINE

## 2024-11-08 PROCEDURE — 3075F SYST BP GE 130 - 139MM HG: CPT | Performed by: INTERNAL MEDICINE

## 2024-11-08 PROCEDURE — 1036F TOBACCO NON-USER: CPT | Performed by: INTERNAL MEDICINE

## 2024-11-08 PROCEDURE — 3008F BODY MASS INDEX DOCD: CPT | Performed by: INTERNAL MEDICINE

## 2024-11-08 PROCEDURE — 3079F DIAST BP 80-89 MM HG: CPT | Performed by: INTERNAL MEDICINE

## 2024-11-08 RX ORDER — SILDENAFIL 50 MG/1
50 TABLET, FILM COATED ORAL AS NEEDED
Qty: 10 TABLET | Refills: 3 | Status: SHIPPED | OUTPATIENT
Start: 2024-11-08

## 2024-11-08 NOTE — PROGRESS NOTES
"Subjective   Patient ID: Marianne Huertas is a 64 y.o. male who presents for Follow-up, Headache, and Tinnitus.    Here with several concerns-with headaches and tinnitus  Recently had right knee arthroscopy September 26.  Presently his knee is better but he has had recurrent headaches-mainly frontal in location.  He has a history of headaches in the past, typically perhaps 1 a week at baseline Tylenol typically helpful  Since surgery he has had more headaches, daily which are more noticeable.  He was using more ibuprofen more often.  He was using ibuprofen, 200 mg, 2 tablets every 4-6 hours, which is much more and more frequent than he has ever done before.   He stopped the ibuprofen about 3 weeks ago.    He stopped the ibuprofen and the headaches seem to improve.  However the tinnitus, which he describes as bilateral and high-pitched, has not resolved.  He does not really have a history of tinnitus.  Denies any recent trauma or loud noises.  Tinnitus is a bit more noticeable at nighttime.         Review of Systems    Objective   /86   Pulse 74   Temp 36.2 °C (97.2 °F)   Ht 1.791 m (5' 10.5\")   Wt 122 kg (269 lb 3.2 oz)   SpO2 95%   BMI 38.08 kg/m²     Physical Exam  Vitals reviewed.   Constitutional:       Appearance: Normal appearance.   HENT:      Head: Normocephalic and atraumatic.      Right Ear: Tympanic membrane normal.      Left Ear: Tympanic membrane normal.   Eyes:      General: No scleral icterus.        Right eye: No discharge.         Left eye: No discharge.      Extraocular Movements: Extraocular movements intact.      Conjunctiva/sclera: Conjunctivae normal.      Pupils: Pupils are equal, round, and reactive to light.   Cardiovascular:      Rate and Rhythm: Normal rate and regular rhythm.      Pulses: Normal pulses.      Heart sounds: Normal heart sounds. No murmur heard.  Pulmonary:      Effort: Pulmonary effort is normal.      Breath sounds: Normal breath sounds. No wheezing or " rhonchi.   Musculoskeletal:         General: No deformity or signs of injury. Normal range of motion.      Cervical back: Normal range of motion and neck supple. No rigidity or tenderness.      Comments: Right knee arthroscopy incisions healed.  Minimal soft tissue swelling without obvious effusion   Lymphadenopathy:      Cervical: No cervical adenopathy.   Skin:     General: Skin is warm and dry.      Findings: No rash.   Neurological:      General: No focal deficit present.      Mental Status: He is alert and oriented to person, place, and time. Mental status is at baseline.      Cranial Nerves: No cranial nerve deficit.      Sensory: No sensory deficit.      Gait: Gait normal.   Psychiatric:         Mood and Affect: Mood normal.         Behavior: Behavior normal.         Thought Content: Thought content normal.         Judgment: Judgment normal.         Assessment/Plan   Problem List Items Addressed This Visit             ICD-10-CM    Allergic rhinitis J30.9    Allergic rhinitis due to cats J30.81    Allergic rhinitis due to house dust mite J30.89    Benign enlargement of prostate N40.0    Obstructive sleep apnea on CPAP G47.33    Prediabetes R73.03    Tinnitus of both ears H93.13    Primary osteoarthritis of right knee M17.11    H/O arthroscopy of left knee Z98.890    H/O arthroscopy of right knee Z98.890     Other Visit Diagnoses         Codes    Increased frequency of headaches    -  Primary R51.9    Relevant Orders    CT head wo IV contrast    Erectile disorder     N52.9    Relevant Medications    sildenafil (Viagra) 50 mg tablet                Portions of this encounter note have been copied from my previous note dated 6/3/2024, which have been updated where appropriate and all reflect my current medical decision making from today.     Headaches-worsened since his late September right knee arthroscopy-he had for some time been having headaches typically once a week-he was having rather severe daily  headaches-they have improved a bit with cessation of the ibuprofen.  He checked his home carbon monoxide detector.  He will continue to look for triggers.  He will optimize his sleep and check a CAT scan of the head.  He will continue his allergy plan including Allegra and Flonase.  Of note he is allergic to dogs and he has a new puppy    Tinnitus-history and exam nondiagnostic.  He avoids loud noises.  Discussed white music at night, and if not improved follow-up with ENT.        S/p right knee arthroscopy 9/26/2024 with Dr. Tello.  Improved function and less pain so far          11/24-he notes some residual stiffness-he will continue PT and he plans to [place] back with Dr. Fitzgerald to consider a gel injection.  He notes there is some residual arthritis within the knee    Hx  Left knee meniscal tear 10/22-requiring arthroscopy with Dr. Tello late February 2023.  He had some lingering left knee pain, which improved after the gel injection 12/23.    10-year cardiac risk September 20 21- 6.4%-he understands he will likely need a statin at some point over the next several years. Fortunately calcium score = zero March 2018 We will continue to reassess annually. Weight loss would be in his best interest as well as exercise consistently     History of Atypical chest pain syndrome- September 2018- this came on when he was reaching with his left arm and resolve spontaneously after 5 hours. Evaluation in the emergency room showed no pulmonary embolus did have some pleuritic component with the pain worsening with deep inspiration. Cardiac enzymes negative. Presently he has no musculoskeletal or respiratory symptoms. Will defer further evaluation presently. No recurrence     Hypertension- blood pressure improved. He will continue weight loss efforts and doxazosin. Blood pressure improved on recheck          11/24-he met with our pharmacist who found his home machine correlates.  Interesting the blood pressure is slightly high in the  left arm than the right arm.  He will follow blood pressure in his left arm at home and notify me if the average is not consistently under 130/88.     Elevated weight with BMI over 30 / dyslipidemia- in the past, discussed swimming and weight loss efforts in the past. In the past, encouraged to follow up with a dietician when he is back in town to see what foods he could eat.                11/23-BMI 39.6.  He will continue his diet and exercise efforts              6/24-BMI 38.9.  Weight is down 5 pounds in 6 months.  He will continue efforts.               11/24-BMI 38.1.  Fasting lipids planned soon before his next appointment       Exercise routine - before knee injury elliptical at home-3 times weekly-he also walks the dogs daily. Suggested he try to advance his elliptical intensity          He plans to resume his shorter walks and elliptical workouts this winter     Prediabetes- reviewed his A1c trend since 2014. Recently improved- A1c 5.9% March 2022. Reviewed information sheet on A1c, and the relation to impaired glucose metabolism. Home glucometer suggested-check his glucose at times. He will continue long-term weight loss efforts and will follow the A1c semiannually             11/23-A1c stable at 6%.  We will follow semiannually              6/24-A1c a bit improved at 5.9%.        Left nipple sensitivity-6/24-not improved with the topical steroid provided by his dermatologist recently-exam unremarkable.  Discussed the possibility that with the asymmetry, the left nipple may be some irritation or abrasion that the right nipple has not experienced.  He will watch DNI to make sure that there is no inadvertent trauma to this area.  He will notify me over the next several weeks if not improved.            11/24-gynecomastia evaluation completed/nondiagnostic.  Suspect physiologic finding    Perennial allergic rhinitis/reactive airway disease/recent cough-he will continue fluticasone and Allegra  daily-especially with spring pollen anticipated   Discussed upcoming spring season-he will have his medicines on hand as needed     Sleep apnea- CPAP nightly continues. no issues. He has one he travels w/ unit too       Right lower abdominal wall discomfort-occasionally recurrent-not recently problematic     Remote right inguinal hernia repair-as an infant-no obvious hernia on past recheck.     Acid reflux/dyspepsia/hiatal hernia- EGD updated 2014- esophagitis- Prevacid helped-he is off of this presently. Certain foods cause abdominal symptoms and he tends to avoid these. Less symptoms when he does not eat is late in the evening. No swallowing discomfort encouraged him to travel with this and use this with any recurrent abdominal acid symptoms. Pepcid used as needed as well.     IBS- he'll has pain just left of the umbilical area, evaluated in GI by Dr. Espinoza in the past. long-standing abdominal bloating with certain foods Now Pepcid as needed for breakthrough symptoms in the evening. Benefiber used consistently & MiraLAX daily. In the past suggested probiotic daily.. Overall with better diet and weight loss, using his antacid far less frequently, typically just before spicy meals   With constipation he tends to have left mid abdominal discomfort which has happened recently. Encouraged ample fluid and fiber to avoid constipation- which he has been doing consistently   He will continue colonoscopy surveillance per Dr. Espinoza-every 10 years. Updated September 2014. Next in September 2024.     Colonoscopy completed 12/24    Tubular adenomatous colon polyp-noted on 12/24 colonoscopy.  Next recommended 5 years-12/29.                          Urology care with past right orchitis / Hx epididymitis/gross hematuria/BPH/ED/urology care/family history of bladder cancer-father-recent cystoscopy and CT urogram per Dr. Shah.             11/23-PSA stable.  He recently had a bout of left testicular pain diagnosed as  epididymitis in urgent care.  He received 7 days of Levaquin-still some residual symptoms.  He will take 3 more days of Levaquin and discussed with Dr. Shah who he coincidentally sees later this week.              6/24-PSA normal.                11/24-sildenafil refilled.  He is scheduled to see Dr. Shah 12/24 with a PSA before                    History of umbilical hernia repair-Asymptomatic        History of left shoulder dislocation-he saw Dr. Chichendance; surgery not needed. Asymptomatic presently.      Bilateral plantar fasciitis- occas an issue. insoles helpful     Remote left elbow ulnar transposition surgery/bilateral carpal tunnel symptoms-although an EMG did not necessarily show carpal tunnel he states, he uses wrist splints most nights which help hand tingling during the daytime. Encouraged optimal position including keeping his elbow straight at night as he has mainly fourth and fifth finger tingling symptoms more suggestive of ulnar issues        Skin care -family history of melanoma-father--he will continue to see Dr. Martins annually; no concerns     Dental care-encouraged semiannual dental visits. Next appt in 12/22.     Dry eyes / Vision care-vision visits encouraged at least biannually. updated 2017. Reading glasses sufficient. May need an update to his spectacles for distance.         Flu shot each fall-updated 10/24     Covid series completed.  Additional booster 10/24     Shingrix- updated 12/19.    RSV-he will get that soon    Tdap booster-will be needed before 11/25     Follow-up semiannually or sooner as needed     Charting was completed using voice recognition technology and may include unintended errors.

## 2024-11-09 PROBLEM — Z98.890 H/O ARTHROSCOPY OF RIGHT KNEE: Status: ACTIVE | Noted: 2024-11-09

## 2024-11-09 PROBLEM — R68.89 INFLUENZA-LIKE SYMPTOMS: Status: RESOLVED | Noted: 2023-05-10 | Resolved: 2024-11-09

## 2024-11-09 PROBLEM — R31.0 GROSS HEMATURIA: Status: RESOLVED | Noted: 2023-05-10 | Resolved: 2024-11-09

## 2024-11-09 PROBLEM — S43.005A DISLOCATION OF SHOULDER REGION, LEFT, INITIAL ENCOUNTER: Status: RESOLVED | Noted: 2023-05-10 | Resolved: 2024-11-09

## 2024-11-09 PROBLEM — S83.232A COMPLEX TEAR OF MEDIAL MENISCUS OF LEFT KNEE AS CURRENT INJURY: Status: RESOLVED | Noted: 2023-05-10 | Resolved: 2024-11-09

## 2024-11-09 PROBLEM — S83.203A COMPLEX TEAR OF MENISCUS OF RIGHT KNEE AS CURRENT INJURY: Status: RESOLVED | Noted: 2024-09-19 | Resolved: 2024-11-09

## 2024-11-09 PROBLEM — B02.9 HERPES ZOSTER: Status: RESOLVED | Noted: 2023-05-10 | Resolved: 2024-11-09

## 2024-11-09 PROBLEM — Z98.890 H/O ARTHROSCOPY OF LEFT KNEE: Status: ACTIVE | Noted: 2024-11-09

## 2024-11-09 PROBLEM — M25.561 RIGHT KNEE PAIN: Status: RESOLVED | Noted: 2023-05-10 | Resolved: 2024-11-09

## 2024-11-09 PROBLEM — N45.2 ORCHITIS, RIGHT: Status: RESOLVED | Noted: 2023-05-10 | Resolved: 2024-11-09

## 2024-11-14 ENCOUNTER — HOSPITAL ENCOUNTER (OUTPATIENT)
Dept: RADIOLOGY | Facility: CLINIC | Age: 64
Discharge: HOME | End: 2024-11-14
Payer: COMMERCIAL

## 2024-11-14 DIAGNOSIS — R51.9 INCREASED FREQUENCY OF HEADACHES: ICD-10-CM

## 2024-11-14 PROCEDURE — 70450 CT HEAD/BRAIN W/O DYE: CPT | Performed by: RADIOLOGY

## 2024-11-14 PROCEDURE — 70450 CT HEAD/BRAIN W/O DYE: CPT

## 2024-11-15 NOTE — RESULT ENCOUNTER NOTE
Marianne    Thanks for doing the CAT scan of the head.  I am glad that the radiologist does not see any worrisome findings that might suggest a stroke or mass.  In summary, there are no worrisome findings on the head CT.    Once again thanks for doing this important test.    Sincerely,  Dylon Barnett MD

## 2024-11-18 ENCOUNTER — APPOINTMENT (OUTPATIENT)
Dept: RADIOLOGY | Facility: CLINIC | Age: 64
End: 2024-11-18
Payer: COMMERCIAL

## 2024-11-25 ENCOUNTER — APPOINTMENT (OUTPATIENT)
Dept: PRIMARY CARE | Facility: CLINIC | Age: 64
End: 2024-11-25
Payer: COMMERCIAL

## 2024-11-25 VITALS
BODY MASS INDEX: 38.11 KG/M2 | OXYGEN SATURATION: 97 % | HEART RATE: 74 BPM | WEIGHT: 272.2 LBS | DIASTOLIC BLOOD PRESSURE: 84 MMHG | HEIGHT: 71 IN | SYSTOLIC BLOOD PRESSURE: 126 MMHG

## 2024-11-25 DIAGNOSIS — H93.13 TINNITUS OF BOTH EARS: ICD-10-CM

## 2024-11-25 DIAGNOSIS — G47.33 OBSTRUCTIVE SLEEP APNEA ON CPAP: ICD-10-CM

## 2024-11-25 DIAGNOSIS — E66.01 CLASS 2 SEVERE OBESITY WITH SERIOUS COMORBIDITY AND BODY MASS INDEX (BMI) OF 38.0 TO 38.9 IN ADULT, UNSPECIFIED OBESITY TYPE: Chronic | ICD-10-CM

## 2024-11-25 DIAGNOSIS — R35.1 BENIGN PROSTATIC HYPERPLASIA WITH NOCTURIA: ICD-10-CM

## 2024-11-25 DIAGNOSIS — Z98.890 H/O ARTHROSCOPY OF RIGHT KNEE: ICD-10-CM

## 2024-11-25 DIAGNOSIS — J30.89 ALLERGIC RHINITIS DUE TO HOUSE DUST MITE: ICD-10-CM

## 2024-11-25 DIAGNOSIS — N52.9 MALE ERECTILE DISORDER OF ORGANIC ORIGIN: ICD-10-CM

## 2024-11-25 DIAGNOSIS — E55.9 VITAMIN D DEFICIENCY: Chronic | ICD-10-CM

## 2024-11-25 DIAGNOSIS — E78.5 DYSLIPIDEMIA, GOAL LDL BELOW 100: ICD-10-CM

## 2024-11-25 DIAGNOSIS — Z00.00 ANNUAL PHYSICAL EXAM: Primary | ICD-10-CM

## 2024-11-25 DIAGNOSIS — D12.6 TUBULAR ADENOMA OF COLON: ICD-10-CM

## 2024-11-25 DIAGNOSIS — J30.1 SEASONAL ALLERGIC RHINITIS DUE TO POLLEN: ICD-10-CM

## 2024-11-25 DIAGNOSIS — I10 ESSENTIAL HYPERTENSION: ICD-10-CM

## 2024-11-25 DIAGNOSIS — R30.0 DYSURIA: ICD-10-CM

## 2024-11-25 DIAGNOSIS — R73.03 PREDIABETES: ICD-10-CM

## 2024-11-25 DIAGNOSIS — Z00.00 ROUTINE HEALTH MAINTENANCE: ICD-10-CM

## 2024-11-25 DIAGNOSIS — R51.9 NONINTRACTABLE HEADACHE, UNSPECIFIED CHRONICITY PATTERN, UNSPECIFIED HEADACHE TYPE: ICD-10-CM

## 2024-11-25 DIAGNOSIS — E66.812 CLASS 2 SEVERE OBESITY WITH SERIOUS COMORBIDITY AND BODY MASS INDEX (BMI) OF 38.0 TO 38.9 IN ADULT, UNSPECIFIED OBESITY TYPE: Chronic | ICD-10-CM

## 2024-11-25 DIAGNOSIS — N40.1 BENIGN PROSTATIC HYPERPLASIA WITH NOCTURIA: ICD-10-CM

## 2024-11-25 DIAGNOSIS — Z98.890 H/O ARTHROSCOPY OF LEFT KNEE: ICD-10-CM

## 2024-11-25 PROBLEM — J01.90 ACUTE SINUSITIS: Status: RESOLVED | Noted: 2023-05-10 | Resolved: 2024-11-25

## 2024-11-25 PROBLEM — J06.9 URI (UPPER RESPIRATORY INFECTION): Status: RESOLVED | Noted: 2023-05-10 | Resolved: 2024-11-25

## 2024-11-25 PROCEDURE — 93000 ELECTROCARDIOGRAM COMPLETE: CPT | Performed by: INTERNAL MEDICINE

## 2024-11-25 PROCEDURE — 99396 PREV VISIT EST AGE 40-64: CPT | Performed by: INTERNAL MEDICINE

## 2024-11-25 PROCEDURE — 3008F BODY MASS INDEX DOCD: CPT | Performed by: INTERNAL MEDICINE

## 2024-11-25 PROCEDURE — 3079F DIAST BP 80-89 MM HG: CPT | Performed by: INTERNAL MEDICINE

## 2024-11-25 PROCEDURE — 3074F SYST BP LT 130 MM HG: CPT | Performed by: INTERNAL MEDICINE

## 2024-11-25 NOTE — PROGRESS NOTES
"Subjective   Patient ID: Marianne Huertas is a 64 y.o. male who presents for Annual Exam.    Here for wellness visit  His headaches have persisted-mainly frontal location.  No obvious triggers.  They seem to occur at times randomly.  No issues with difficult sinus congestion.  No neck spasms.         Review of Systems    Objective   /84 (BP Location: Left arm, Patient Position: Sitting, BP Cuff Size: Large adult)   Pulse 74   Ht 1.791 m (5' 10.5\")   Wt 123 kg (272 lb 3.2 oz)   SpO2 97%   BMI 38.50 kg/m²     Physical Exam  Constitutional:       Appearance: Normal appearance.   HENT:      Head: Normocephalic and atraumatic.      Right Ear: Tympanic membrane normal.      Left Ear: Tympanic membrane normal.      Nose: Nose normal.   Eyes:      General: No scleral icterus.     Extraocular Movements: Extraocular movements intact.      Conjunctiva/sclera: Conjunctivae normal.      Pupils: Pupils are equal, round, and reactive to light.   Cardiovascular:      Rate and Rhythm: Normal rate and regular rhythm.      Pulses: Normal pulses.      Heart sounds: Normal heart sounds. No murmur heard.  Pulmonary:      Effort: Pulmonary effort is normal. No respiratory distress.      Breath sounds: Normal breath sounds. No stridor. No wheezing.   Abdominal:      General: Abdomen is flat. Bowel sounds are normal. There is no distension.      Palpations: Abdomen is soft. There is no mass.      Tenderness: There is no abdominal tenderness. There is no guarding.   Musculoskeletal:         General: No swelling, tenderness or deformity. Normal range of motion.      Cervical back: Normal range of motion and neck supple. No tenderness.   Lymphadenopathy:      Cervical: No cervical adenopathy.   Skin:     General: Skin is warm and dry.      Findings: No lesion or rash.   Neurological:      General: No focal deficit present.      Mental Status: He is alert and oriented to person, place, and time.      Cranial Nerves: No cranial " nerve deficit.      Motor: No weakness.   Psychiatric:         Mood and Affect: Mood normal.         Behavior: Behavior normal.         Thought Content: Thought content normal.         Judgment: Judgment normal.         Assessment/Plan   Problem List Items Addressed This Visit             ICD-10-CM    Allergic rhinitis due to house dust mite J30.89    Allergic rhinitis due to pollen J30.1    Benign enlargement of prostate N40.0    Dyslipidemia, goal LDL below 100 E78.5    Dysuria R30.0    Essential hypertension I10    Relevant Orders    ECG 12 lead (Clinic Performed) (Completed)    Male erectile disorder of organic origin N52.9    Obstructive sleep apnea on CPAP G47.33    Relevant Orders    Referral to Adult Sleep Medicine    Prediabetes R73.03    Tinnitus of both ears H93.13    Relevant Orders    Referral to ENT    Vitamin D deficiency (Chronic) E55.9    Tubular adenoma of colon D12.6    H/O arthroscopy of left knee Z98.890    H/O arthroscopy of right knee Z98.890    Annual physical exam - Primary Z00.00    Class 2 severe obesity with serious comorbidity and body mass index (BMI) of 38.0 to 38.9 in adult (Chronic) E66.812, E66.01, Z68.38     Other Visit Diagnoses         Codes    Routine health maintenance     Z00.00    Nonintractable headache, unspecified chronicity pattern, unspecified headache type     R51.9    Relevant Orders    Referral to Neurology    Vitamin B12    Sedimentation Rate    C-reactive protein                Portions of this encounter note have been copied from my previous note dated 11/8/2024, which have been updated where appropriate and all reflect my current medical decision making from today.     Labs from 8/8 & 7/23n - rev'd  CT  head from  11/14 rev'd    Headaches-worsened since his late September right knee arthroscopy-he had for some time been having headaches typically once a week-he was having rather severe daily headaches-they have improved a bit with cessation of the ibuprofen.  He  checked his home carbon monoxide detector.  He will continue to look for triggers.  He will optimize his sleep and check a CAT scan of the head.  He will continue his allergy plan including Allegra and Flonase.  Of note he is allergic to dogs and he has a new puppy             11/24-his headaches have persisted.  We spoke at length.  He has a long history of headaches-extending back into his 20s.  Presently his headaches seem to have changed in character.  Head CT unremarkable.  He will continue to watch for triggers.  He will double check his CPAP settings with a visit to the sleep medicine team.  He will also set up allergy evaluation.  He does not think there is any issues with teeth grinding or bruxism, nor does he have TMJ symptoms.  He does not have cervical spine issues.  No history of trauma or head injuries.  He will try extra strength Excedrin.  He will continue to work to keep a diary of potential triggers or symptoms associated with his headaches and set up a neurology consultation.  He will check additional labs including screening for vasculitis with ESR CRP.  He would like a B12 level checked as well.  Ordered    Tinnitus-history and exam nondiagnostic.  He avoids loud noises.  Discussed white music at night, and if not improved follow-up with ENT.            11/24-low-grade tinnitus has persisted-he will set up ENT evaluation        S/p right knee arthroscopy 9/26/2024 with Dr. Tello.  Improved function and less pain so far          11/24-he notes some residual stiffness-he will continue PT and he plans to [place] back with Dr. Fitzgerald to consider a gel injection.  He notes there is some residual arthritis within the knee    Hx  Left knee meniscal tear 10/22-requiring arthroscopy with Dr. Tello late February 2023.  He had some lingering left knee pain, which improved after the gel injection 12/23.    10-year cardiac risk September 20 21- 6.4%-he understands he will likely need a statin at some point over  the next several years. Fortunately calcium score = zero March 2018 We will continue to reassess annually. Weight loss would be in his best interest as well as exercise consistently     History of Atypical chest pain syndrome- September 2018- this came on when he was reaching with his left arm and resolve spontaneously after 5 hours. Evaluation in the emergency room showed no pulmonary embolus did have some pleuritic component with the pain worsening with deep inspiration. Cardiac enzymes negative. Presently he has no musculoskeletal or respiratory symptoms. Will defer further evaluation presently.             No recurrence     Hypertension- blood pressure improved. He will continue weight loss efforts and doxazosin. Blood pressure improved on recheck          11/24-he met with our pharmacist who found his home machine correlates.  Interesting the blood pressure is slightly high in the left arm than the right arm.  He will follow blood pressure in his left arm at home and notify me if the average is not consistently under 130/88.     Elevated weight with BMI over 30 / dyslipidemia- in the past, discussed swimming and weight loss efforts in the past. In the past, encouraged to follow up with a dietician when he is back in town to see what foods he could eat.                11/23-BMI 39.6.  He will continue his diet and exercise efforts              6/24-BMI 38.9.  Weight is down 5 pounds in 6 months.  He will continue efforts.               11/24-BMI 38.5.  Awaiting fasting labs     Exercise routine - before knee injury elliptical at home-3 times weekly-he also walks the dogs daily. Suggested he try to advance his elliptical intensity          He plans to resume his shorter walks and elliptical workouts this winter     Prediabetes- reviewed his A1c trend since 2014. Recently improved- A1c 5.9% March 2022. Reviewed information sheet on A1c, and the relation to impaired glucose metabolism. Home glucometer  suggested-check his glucose at times. He will continue long-term weight loss efforts and will follow the A1c semiannually             11/23-A1c stable at 6%.  We will follow semiannually              6/24-A1c a bit improved at 5.9%.               11/24-awaiting fasting labs        Hx Left nipple sensitivity-6/24-not improved with the topical steroid provided by his dermatologist recently-exam unremarkable.  Discussed the possibility that with the asymmetry, the left nipple may be some irritation or abrasion that the right nipple has not experienced.  He will watch DNI to make sure that there is no inadvertent trauma to this area.  He will notify me over the next several weeks if not improved.            11/24-gynecomastia evaluation completed/nondiagnostic.  Suspect physiologic finding.  Clinically improved    Perennial allergic rhinitis/reactive airway disease/recent cough-he will continue fluticasone and Allegra daily-especially with spring pollen anticipated   Discussed upcoming spring season-he will have his medicines on hand as needed          11/24-with his headache history-allergy consultation ordered     Sleep apnea- CPAP nightly continues. no issues. He has one he travels w/ unit too           11/24-it has been years since his CPAP settings were last checked.  In light of his low-grade headache history-he will set up a appointment with the sleep medicine team to reevaluate help with this.       Right lower abdominal wall discomfort-occasionally recurrent-          not recently problematic     Remote right inguinal hernia repair-as an infant-no obvious hernia on past recheck.     Acid reflux/dyspepsia/hiatal hernia- EGD updated 2014- esophagitis- Prevacid helped-he is off of this presently. Certain foods cause abdominal symptoms and he tends to avoid these. Less symptoms when he does not eat is late in the evening. No swallowing discomfort encouraged him to travel with this and use this with any recurrent  abdominal acid symptoms. Pepcid used as needed as well.            Presently generally controlled for the most part     IBS- he'll has pain just left of the umbilical area, evaluated in GI by Dr. Espinoza in the past. long-standing abdominal bloating with certain foods Now Pepcid as needed for breakthrough symptoms in the evening. Benefiber used consistently & MiraLAX daily. In the past suggested probiotic daily.            Overall with better diet and weight loss, using his antacid far less frequently, typically just before spicy meals.  He avoids onions as well    Tubular adenomatous colon polyp-noted on 12/24 colonoscopy.  Next recommended 5 years-12/29.                          Urology care with past right orchitis / Hx epididymitis/gross hematuria/BPH/ED/urology care/family history of bladder cancer-father-recent cystoscopy and CT urogram per Dr. Shah.             11/23-PSA stable.  He recently had a bout of left testicular pain diagnosed as epididymitis in urgent care.  He received 7 days of Levaquin-still some residual symptoms.  He will take 3 more days of Levaquin and discussed with Dr. Shah who he coincidentally sees later this week.              6/24-PSA normal.                11/24-sildenafil refilled.  He is scheduled to see Dr. Shah 12/24 with a PSA before                  History of umbilical hernia repair-Asymptomatic        History of left shoulder dislocation-he saw Dr. Chichendance; surgery not needed. Asymptomatic presently.      Bilateral plantar fasciitis- occas an issue. insoles helpful     Remote left elbow ulnar transposition surgery/bilateral carpal tunnel symptoms-although an EMG did not necessarily show carpal tunnel he states, he uses wrist splints most nights which help hand tingling during the daytime. Encouraged optimal position including keeping his elbow straight at night as he has mainly fourth and fifth finger tingling symptoms more suggestive of ulnar issues        Skin care  -family history of melanoma-father--he will continue to see Dr. Martins annually; no concerns     Dental care-encouraged semiannual dental visits. Next appt in 12/22.     Dry eyes / Vision care-vision visits encouraged at least biannually. updated 2017. Reading glasses sufficient. May need an update to his spectacles for distance.         Flu shot each fall-updated 10/24     Covid series completed.  Additional booster 10/24     Shingrix- updated 12/19.    RSV-he will get that soon    Tdap booster-will be needed before 11/25     Follow-up semiannually or sooner as needed     Charting was completed using voice recognition technology and may include unintended errors.

## 2024-12-02 ENCOUNTER — LAB (OUTPATIENT)
Dept: LAB | Facility: LAB | Age: 64
End: 2024-12-02
Payer: COMMERCIAL

## 2024-12-02 DIAGNOSIS — G47.33 OBSTRUCTIVE SLEEP APNEA ON CPAP: ICD-10-CM

## 2024-12-02 DIAGNOSIS — E55.9 VITAMIN D DEFICIENCY: ICD-10-CM

## 2024-12-02 DIAGNOSIS — E78.5 DYSLIPIDEMIA, GOAL LDL BELOW 100: ICD-10-CM

## 2024-12-02 DIAGNOSIS — Z12.5 SCREENING FOR PROSTATE CANCER: ICD-10-CM

## 2024-12-02 DIAGNOSIS — R51.9 NONINTRACTABLE HEADACHE, UNSPECIFIED CHRONICITY PATTERN, UNSPECIFIED HEADACHE TYPE: ICD-10-CM

## 2024-12-02 DIAGNOSIS — R73.03 PREDIABETES: ICD-10-CM

## 2024-12-02 LAB
25(OH)D3 SERPL-MCNC: 38 NG/ML (ref 30–100)
ALBUMIN SERPL BCP-MCNC: 4.3 G/DL (ref 3.4–5)
ALP SERPL-CCNC: 54 U/L (ref 33–136)
ALT SERPL W P-5'-P-CCNC: 19 U/L (ref 10–52)
ANION GAP SERPL CALC-SCNC: 9 MMOL/L (ref 10–20)
AST SERPL W P-5'-P-CCNC: 16 U/L (ref 9–39)
BILIRUB SERPL-MCNC: 0.8 MG/DL (ref 0–1.2)
BUN SERPL-MCNC: 20 MG/DL (ref 6–23)
CALCIUM SERPL-MCNC: 9.4 MG/DL (ref 8.6–10.6)
CHLORIDE SERPL-SCNC: 105 MMOL/L (ref 98–107)
CHOLEST SERPL-MCNC: 190 MG/DL (ref 0–199)
CHOLESTEROL/HDL RATIO: 3
CO2 SERPL-SCNC: 28 MMOL/L (ref 21–32)
CREAT SERPL-MCNC: 0.97 MG/DL (ref 0.5–1.3)
CRP SERPL-MCNC: 0.31 MG/DL
EGFRCR SERPLBLD CKD-EPI 2021: 88 ML/MIN/1.73M*2
ERYTHROCYTE [DISTWIDTH] IN BLOOD BY AUTOMATED COUNT: 13.8 % (ref 11.5–14.5)
ERYTHROCYTE [SEDIMENTATION RATE] IN BLOOD BY WESTERGREN METHOD: 3 MM/H (ref 0–20)
EST. AVERAGE GLUCOSE BLD GHB EST-MCNC: 123 MG/DL
GLUCOSE SERPL-MCNC: 98 MG/DL (ref 74–99)
HBA1C MFR BLD: 5.9 %
HCT VFR BLD AUTO: 42.7 % (ref 41–52)
HDLC SERPL-MCNC: 64.2 MG/DL
HGB BLD-MCNC: 13.9 G/DL (ref 13.5–17.5)
LDLC SERPL CALC-MCNC: 99 MG/DL
MCH RBC QN AUTO: 28.1 PG (ref 26–34)
MCHC RBC AUTO-ENTMCNC: 32.6 G/DL (ref 32–36)
MCV RBC AUTO: 86 FL (ref 80–100)
NON HDL CHOLESTEROL: 126 MG/DL (ref 0–149)
NRBC BLD-RTO: 0 /100 WBCS (ref 0–0)
PLATELET # BLD AUTO: 246 X10*3/UL (ref 150–450)
POTASSIUM SERPL-SCNC: 4.2 MMOL/L (ref 3.5–5.3)
PROT SERPL-MCNC: 7.1 G/DL (ref 6.4–8.2)
PSA SERPL-MCNC: 2.56 NG/ML
RBC # BLD AUTO: 4.95 X10*6/UL (ref 4.5–5.9)
SODIUM SERPL-SCNC: 138 MMOL/L (ref 136–145)
TRIGL SERPL-MCNC: 132 MG/DL (ref 0–149)
TSH SERPL-ACNC: 2.04 MIU/L (ref 0.44–3.98)
VIT B12 SERPL-MCNC: 434 PG/ML (ref 211–911)
VLDL: 26 MG/DL (ref 0–40)
WBC # BLD AUTO: 6.2 X10*3/UL (ref 4.4–11.3)

## 2024-12-02 PROCEDURE — 86140 C-REACTIVE PROTEIN: CPT

## 2024-12-02 PROCEDURE — 85027 COMPLETE CBC AUTOMATED: CPT

## 2024-12-02 PROCEDURE — 80061 LIPID PANEL: CPT

## 2024-12-02 PROCEDURE — 82607 VITAMIN B-12: CPT

## 2024-12-02 PROCEDURE — 80053 COMPREHEN METABOLIC PANEL: CPT

## 2024-12-02 PROCEDURE — 83036 HEMOGLOBIN GLYCOSYLATED A1C: CPT

## 2024-12-02 PROCEDURE — 36415 COLL VENOUS BLD VENIPUNCTURE: CPT

## 2024-12-02 PROCEDURE — 85652 RBC SED RATE AUTOMATED: CPT

## 2024-12-02 PROCEDURE — 84443 ASSAY THYROID STIM HORMONE: CPT

## 2024-12-02 PROCEDURE — 82306 VITAMIN D 25 HYDROXY: CPT

## 2024-12-02 PROCEDURE — G0103 PSA SCREENING: HCPCS

## 2024-12-03 DIAGNOSIS — R73.03 PREDIABETES: Primary | ICD-10-CM

## 2024-12-03 NOTE — RESULT ENCOUNTER NOTE
Marianne    Thanks for doing the fasting labs.  I am glad that the kidney, liver, thyroid and prostate labs look normal as to the electrolytes.  The vitamin levels look fine and the complete blood count shows no signs of anemia.  The inflammatory labs also look normal.    The main concern remains the prediabetes with the A1c elevated at 5.9%.  Please continue weight loss efforts through the winter.  Please make a note on your calendar to do nonfasting blood work in late May, before your early June appointment.  Paperwork is not needed as the orders for the nonfasting labs are in the computer.    Wishing you and your family the very best of health in the new year.    Dylon

## 2024-12-04 ENCOUNTER — APPOINTMENT (OUTPATIENT)
Dept: PHARMACY | Facility: HOSPITAL | Age: 64
End: 2024-12-04
Payer: COMMERCIAL

## 2024-12-15 NOTE — PROGRESS NOTES
FUV    Last seen - 6/21/24     HISTORY OF PRESENT ILLNESS:   Marianne Huertas is a 64 y.o. male who is being seen today for FUV with PSA results    PAST MEDICAL HISTORY:  Past Medical History:   Diagnosis Date    Allergies     Body mass index (BMI) 36.0-36.9, adult 09/15/2018    BMI 36.0-36.9,adult    Chest pain, unspecified 11/09/2018    Chest pain syndrome    Class 2 severe obesity with serious comorbidity and body mass index (BMI) of 39.0 to 39.9 in adult 05/12/2023    Closed traumatic dislocation of joint of shoulder region 10/21/2014    Complex tear of medial meniscus of left knee as current injury 05/10/2023    Complex tear of meniscus of right knee as current injury 09/19/2024    Cough, unspecified 01/10/2014    Cough    Dislocation of shoulder region, left, initial encounter 05/10/2023    Elevated blood-pressure reading, without diagnosis of hypertension 07/21/2015    Elevated blood pressure reading without diagnosis of hypertension    Fracture of left shoulder girdle, part unspecified, initial encounter for closed fracture 02/28/2015    Fracture of bone of shoulder, left, closed, initial encounter    GERD (gastroesophageal reflux disease)     Gross hematuria 05/10/2023    Herpes zoster 05/10/2023    Male erectile dysfunction, unspecified 11/08/2018    Male erectile disorder of organic origin    Obstructive sleep apnea (adult) (pediatric) 03/16/2019    Obstructive sleep apnea on CPAP    Orchitis, right 05/10/2023    Other abnormal glucose 09/27/2021    Hemoglobin A1c above reference range    Other general symptoms and signs 03/21/2016    Flu-like symptoms    Pain in left shoulder 12/01/2014    Left shoulder pain    Pain in right shoulder 12/01/2014    Right shoulder pain    Personal history of other diseases of male genital organs 03/31/2022    History of epididymitis    Personal history of other diseases of the digestive system 07/21/2015    History of constipation    Personal history of other diseases  of the digestive system 10/21/2015    History of umbilical hernia    Personal history of other diseases of the nervous system and sense organs 02/28/2015    History of carpal tunnel syndrome    Personal history of other diseases of the nervous system and sense organs 08/19/2014    History of otitis media    Personal history of other diseases of the nervous system and sense organs 03/16/2019    History of sleep apnea    Personal history of other diseases of the respiratory system 09/13/2014    Personal history of asthma    Personal history of other diseases of the respiratory system 03/21/2016    History of acute sinusitis    Personal history of other endocrine, nutritional and metabolic disease 01/18/2018    History of obesity    Personal history of other specified conditions 07/30/2020    History of gross hematuria    Personal history of other specified conditions 02/16/2018    History of snoring    Personal history of other specified conditions 01/09/2014    History of headache    Right knee pain 05/10/2023    Umbilical hernia with obstruction, without gangrene 08/19/2015    Incarcerated umbilical hernia    Unspecified abdominal pain 07/21/2015    Abdominal cramping    Unspecified abdominal pain 09/27/2021    Abdominal wall pain    Unspecified abdominal pain 09/27/2021    Abdominal wall pain    Unspecified dacryocystitis of right lacrimal passage 02/13/2016    Dacrocystitis, right   - Renal cysts  - Epididymitis  - Gross hematuria with negative workup 03/19  - BPH  - ED    PAST SURGICAL HISTORY:  Past Surgical History:   Procedure Laterality Date    COLONOSCOPY  12/13/2013    Complete Colonoscopy    CYSTOSCOPY  12/13/2013    Diagnostic Cystoscopy    ESOPHAGOGASTRODUODENOSCOPY  05/23/2017    Diagnostic Esophagogastroduodenoscopy    KNEE ARTHROSCOPY W/ MENISCAL REPAIR      KNEE ARTHROSCOPY W/ PARTIAL MEDIAL MENISCECTOMY Right 09/26/2024    RIGHT Knee Partial Medial Meniscectomy    KNEE SURGERY Left     OTHER  "SURGICAL HISTORY  12/13/2013    Neuroplasty With Transposition Of Ulnar Nerve - At Elbow    OTHER SURGICAL HISTORY  05/24/2021    Hernia Repair Inguinal Unilateral    UMBILICAL HERNIA REPAIR  11/17/2015    Umbilical Hernia Repair        ALLERGIES:   Allergies   Allergen Reactions    Cat Dander Unknown    House Dust Mite Other        MEDICATIONS:   Current Outpatient Medications   Medication Instructions    cholecalciferol (VITAMIN D-3) 2,000 Units, Daily    doxazosin (CARDURA) 4 mg, oral, Nightly    fexofenadine (ALLEGRA) 180 mg, Daily    fluticasone (Flonase) 50 mcg/actuation nasal spray 2 sprays, Daily    multivitamin tablet 1 tablet, Daily    pantoprazole (ProtoNix) 40 mg EC tablet Take 1 tablet (40 mg) by mouth once daily as needed.    polyethylene glycol (GLYCOLAX, MIRALAX) 17 g, Daily PRN    sildenafil (VIAGRA) 50 mg, oral, As needed        PHYSICAL EXAM:  Blood pressure 158/88, pulse 79.  Constitutional: Patient appears well-developed and well-nourished. No distress.    Pulmonary/Chest: Effort normal. No respiratory distress.   Abdominal: Soft, ND NT  : WNL  Musculoskeletal: Normal range of motion.    Neurological: Alert and oriented to person, place, and time.  Psychiatric: Normal mood and affect. Behavior is normal. Thought content normal.      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 No new discrete testicular lesion. Intact symmetric blood flow to both testes. Very small right hydrocele. Right varicocele " similar to prior.  - Results reviewed with patient. Patient reassured.    Discussion:  No bothersome urinary complaints. Testicular discomfort has resolved. Taking Viagra for ED, which is working well. Does not require any refills.  He is also concerned about slowly rising PSA. Pt reassured. Denies fhx of prostate CA. Will continue to monitor PSA. Follow up in 6 months with PSA prior.     PVR 16  AUA 8  NURIA 10      Assessment:      1. Benign localized prostatic hyperplasia with lower urinary tract symptoms (LUTS)  Measure post void residual      2. Screening for prostate cancer  PSA          Marianne Huertas is a 64 y.o. male here for FUV     Plan:   1) Follow up in 6 months with PSA prior.  2) All questions and concerns were addressed. Patient verbalizes understanding and has no other questions at this time.     Scribe Attestation  By signing my name below, I Cristianamirta Barnesellycecilia Scribe   attest that this documentation has been prepared under the direction and in the presence of Ishan Shah MD.

## 2024-12-16 ENCOUNTER — OFFICE VISIT (OUTPATIENT)
Dept: ORTHOPEDIC SURGERY | Facility: CLINIC | Age: 64
End: 2024-12-16
Payer: COMMERCIAL

## 2024-12-16 DIAGNOSIS — M17.11 PRIMARY OSTEOARTHRITIS OF RIGHT KNEE: Primary | ICD-10-CM

## 2024-12-16 PROCEDURE — 20610 DRAIN/INJ JOINT/BURSA W/O US: CPT | Mod: RT | Performed by: PHYSICIAN ASSISTANT

## 2024-12-16 PROCEDURE — 2500000004 HC RX 250 GENERAL PHARMACY W/ HCPCS (ALT 636 FOR OP/ED): Mod: JZ | Performed by: PHYSICIAN ASSISTANT

## 2024-12-16 PROCEDURE — 1036F TOBACCO NON-USER: CPT | Performed by: PHYSICIAN ASSISTANT

## 2024-12-16 ASSESSMENT — PAIN SCALES - GENERAL: PAINLEVEL_OUTOF10: 3

## 2024-12-16 ASSESSMENT — PAIN - FUNCTIONAL ASSESSMENT: PAIN_FUNCTIONAL_ASSESSMENT: 0-10

## 2024-12-16 NOTE — PROGRESS NOTES
Chief Complaint:  Injection right knee with Synvisc 1    Subjective:  Marianne presents for their Synvisc 1 injection to their right knee.    ROS  Constitutional: No fever, no chills, not feeling tired, no recent weight gain and no recent weight loss  ENT: No nosebleeds  Cardiovascular: No chest pain  Respiratory: No shortness of breath and no cough  Gastrointestinal: No abdominal pain, no nausea, no diarrhea, and no vomiting  Musculoskeletal: No arthralgias  Integumentary: No rashes and no skin lesions  Neurological: No headache  Psychiatric: No sleep disturbances no depression  Endocrine: No muscle weakness and no muscle cramps  Hematologic/lymphatic: No swelling glands and no tendency for easy bruising    Past Medical History:   Diagnosis Date    Allergies     Body mass index (BMI) 36.0-36.9, adult 09/15/2018    BMI 36.0-36.9,adult    Chest pain, unspecified 11/09/2018    Chest pain syndrome    Class 2 severe obesity with serious comorbidity and body mass index (BMI) of 39.0 to 39.9 in adult 05/12/2023    Closed traumatic dislocation of joint of shoulder region 10/21/2014    Complex tear of medial meniscus of left knee as current injury 05/10/2023    Complex tear of meniscus of right knee as current injury 09/19/2024    Cough, unspecified 01/10/2014    Cough    Dislocation of shoulder region, left, initial encounter 05/10/2023    Elevated blood-pressure reading, without diagnosis of hypertension 07/21/2015    Elevated blood pressure reading without diagnosis of hypertension    Fracture of left shoulder girdle, part unspecified, initial encounter for closed fracture 02/28/2015    Fracture of bone of shoulder, left, closed, initial encounter    GERD (gastroesophageal reflux disease)     Gross hematuria 05/10/2023    Herpes zoster 05/10/2023    Male erectile dysfunction, unspecified 11/08/2018    Male erectile disorder of organic origin    Obstructive sleep apnea (adult) (pediatric) 03/16/2019    Obstructive sleep  apnea on CPAP    Orchitis, right 05/10/2023    Other abnormal glucose 09/27/2021    Hemoglobin A1c above reference range    Other general symptoms and signs 03/21/2016    Flu-like symptoms    Pain in left shoulder 12/01/2014    Left shoulder pain    Pain in right shoulder 12/01/2014    Right shoulder pain    Personal history of other diseases of male genital organs 03/31/2022    History of epididymitis    Personal history of other diseases of the digestive system 07/21/2015    History of constipation    Personal history of other diseases of the digestive system 10/21/2015    History of umbilical hernia    Personal history of other diseases of the nervous system and sense organs 02/28/2015    History of carpal tunnel syndrome    Personal history of other diseases of the nervous system and sense organs 08/19/2014    History of otitis media    Personal history of other diseases of the nervous system and sense organs 03/16/2019    History of sleep apnea    Personal history of other diseases of the respiratory system 09/13/2014    Personal history of asthma    Personal history of other diseases of the respiratory system 03/21/2016    History of acute sinusitis    Personal history of other endocrine, nutritional and metabolic disease 01/18/2018    History of obesity    Personal history of other specified conditions 07/30/2020    History of gross hematuria    Personal history of other specified conditions 02/16/2018    History of snoring    Personal history of other specified conditions 01/09/2014    History of headache    Right knee pain 05/10/2023    Umbilical hernia with obstruction, without gangrene 08/19/2015    Incarcerated umbilical hernia    Unspecified abdominal pain 07/21/2015    Abdominal cramping    Unspecified abdominal pain 09/27/2021    Abdominal wall pain    Unspecified abdominal pain 09/27/2021    Abdominal wall pain    Unspecified dacryocystitis of right lacrimal passage 02/13/2016    Dacrocystitis, right         Past Surgical History:   Procedure Laterality Date    COLONOSCOPY  12/13/2013    Complete Colonoscopy    CYSTOSCOPY  12/13/2013    Diagnostic Cystoscopy    ESOPHAGOGASTRODUODENOSCOPY  05/23/2017    Diagnostic Esophagogastroduodenoscopy    KNEE ARTHROSCOPY W/ MENISCAL REPAIR      KNEE ARTHROSCOPY W/ PARTIAL MEDIAL MENISCECTOMY Right 09/26/2024    RIGHT Knee Partial Medial Meniscectomy    KNEE SURGERY Left     OTHER SURGICAL HISTORY  12/13/2013    Neuroplasty With Transposition Of Ulnar Nerve - At Elbow    OTHER SURGICAL HISTORY  05/24/2021    Hernia Repair Inguinal Unilateral    UMBILICAL HERNIA REPAIR  11/17/2015    Umbilical Hernia Repair          Current Outpatient Medications:     cholecalciferol (Vitamin D-3) 25 MCG (1000 UT) tablet, Take 2 tablets (2,000 Units) by mouth once daily., Disp: , Rfl:     doxazosin (Cardura) 4 mg tablet, Take 1 tablet (4 mg) by mouth once daily at bedtime., Disp: 90 tablet, Rfl: 3    fexofenadine (Allegra) 180 mg tablet, Take 1 tablet (180 mg) by mouth once daily., Disp: , Rfl:     fluticasone (Flonase) 50 mcg/actuation nasal spray, Administer 2 sprays into each nostril once daily., Disp: , Rfl:     multivitamin tablet, Take 1 tablet by mouth once daily., Disp: , Rfl:     pantoprazole (ProtoNix) 40 mg EC tablet, Take 1 tablet (40 mg) by mouth once daily as needed., Disp: , Rfl:     polyethylene glycol (Glycolax) 17 gram/dose powder, Mix 17 g of powder and drink once daily as needed., Disp: , Rfl:     sildenafil (Viagra) 50 mg tablet, Take 1 tablet (50 mg) by mouth if needed for erectile dysfunction., Disp: 10 tablet, Rfl: 3     Allergies   Allergen Reactions    Cat Dander Unknown    House Dust Mite Other        Social Connections: Not on file         64-year-old male well appearing in no acute distress. Alert and oriented ×3.  Skin intact bilateral lower extremities.   Normal tandem gait. Coordination and balance intact.  Bilateral lower extremity compartments supple.  2+  DP/PT pulses bilaterally.  Right knee injection site is clear    L Inj/Asp: R knee on 12/16/2024 10:15 AM  Indications: pain  Details: 22 G needle, superolateral approach  Medications: 48 mg hylan 48 mg/6 mL           Procedure:  Right knee intra-articular Synvisc 1 injection is offered for therapeutic purposes.  Indication is knee pain and osteoarthritis.  Risks and benefits of the injection were discussed.  After questions were answered, consent was provided to proceed.  Under sterile conditions, the knee was injected through a superolateral patellar site with pre-loaded syringe of Synvisc 1 without complication.  The patient tolerated the injection well.  A dressing was applied.  Post injection instructions were given.  Lot #[id number]  Exp: 5/2027    Diagnosis:  Right knee osteoarthritis    Plan:    Hopefully patient will see good results with the injection.  We can repeat injections again in 6 months if needed.    This office note was dictated using Dragon voice to text software and was not proofread for spelling or grammatical errors

## 2024-12-20 ENCOUNTER — OFFICE VISIT (OUTPATIENT)
Dept: UROLOGY | Facility: HOSPITAL | Age: 64
End: 2024-12-20
Payer: COMMERCIAL

## 2024-12-20 VITALS — SYSTOLIC BLOOD PRESSURE: 158 MMHG | HEART RATE: 79 BPM | DIASTOLIC BLOOD PRESSURE: 88 MMHG

## 2024-12-20 DIAGNOSIS — N40.1 BENIGN LOCALIZED PROSTATIC HYPERPLASIA WITH LOWER URINARY TRACT SYMPTOMS (LUTS): ICD-10-CM

## 2024-12-20 DIAGNOSIS — Z12.5 SCREENING FOR PROSTATE CANCER: ICD-10-CM

## 2024-12-20 PROCEDURE — G2211 COMPLEX E/M VISIT ADD ON: HCPCS | Performed by: UROLOGY

## 2024-12-20 PROCEDURE — 1036F TOBACCO NON-USER: CPT | Performed by: UROLOGY

## 2024-12-20 PROCEDURE — 99214 OFFICE O/P EST MOD 30 MIN: CPT | Performed by: UROLOGY

## 2024-12-20 PROCEDURE — 3077F SYST BP >= 140 MM HG: CPT | Performed by: UROLOGY

## 2024-12-20 PROCEDURE — 3079F DIAST BP 80-89 MM HG: CPT | Performed by: UROLOGY

## 2024-12-20 ASSESSMENT — PATIENT HEALTH QUESTIONNAIRE - PHQ9
2. FEELING DOWN, DEPRESSED OR HOPELESS: NOT AT ALL
2. FEELING DOWN, DEPRESSED OR HOPELESS: NOT AT ALL
1. LITTLE INTEREST OR PLEASURE IN DOING THINGS: NOT AT ALL
SUM OF ALL RESPONSES TO PHQ9 QUESTIONS 1 AND 2: 0
1. LITTLE INTEREST OR PLEASURE IN DOING THINGS: NOT AT ALL
SUM OF ALL RESPONSES TO PHQ9 QUESTIONS 1 AND 2: 0

## 2024-12-26 ASSESSMENT — ENCOUNTER SYMPTOMS
HYPERTENSION: 1
HEADACHES: 1

## 2024-12-26 NOTE — PROGRESS NOTES
Pharmacist Clinic: Hypertension Management    Marianne Huertas was referred to the Clinical Pharmacy Team for his blood pressure management.    Referring Provider: Dylon Barnett MD     Subjective     Hypertension  This is a chronic problem. The current episode started more than 1 year ago. Associated symptoms include headaches. There are no associated agents to hypertension. Risk factors for coronary artery disease include dyslipidemia, male gender and obesity.     At last pharmacy encounter we validated patient's home blood pressure monitor and his blood pressures were averaging at 128/80 mmHg which is at goal of <130/80 mmHg. We are following-up today to ensure he has maintained the same blood pressure control since last encounter.     Since last encounter patient saw Dr. Barnett on 11/25/24. His BP in office was 126/84 mmHg. He was instructed to check his blood pressure in his left arm at home and to notify Dr. Barnett if it is consistently not under 130/80 mmHg.     Past Medical History:  He has a past medical history of Allergies, Body mass index (BMI) 36.0-36.9, adult (09/15/2018), Chest pain, unspecified (11/09/2018), Class 2 severe obesity with serious comorbidity and body mass index (BMI) of 39.0 to 39.9 in adult (05/12/2023), Closed traumatic dislocation of joint of shoulder region (10/21/2014), Complex tear of medial meniscus of left knee as current injury (05/10/2023), Complex tear of meniscus of right knee as current injury (09/19/2024), Cough, unspecified (01/10/2014), Dislocation of shoulder region, left, initial encounter (05/10/2023), Elevated blood-pressure reading, without diagnosis of hypertension (07/21/2015), Fracture of left shoulder girdle, part unspecified, initial encounter for closed fracture (02/28/2015), GERD (gastroesophageal reflux disease), Gross hematuria (05/10/2023), Herpes zoster (05/10/2023), Male erectile dysfunction, unspecified (11/08/2018), Obstructive sleep apnea (adult)  (pediatric) (03/16/2019), Orchitis, right (05/10/2023), Other abnormal glucose (09/27/2021), Other general symptoms and signs (03/21/2016), Pain in left shoulder (12/01/2014), Pain in right shoulder (12/01/2014), Personal history of other diseases of male genital organs (03/31/2022), Personal history of other diseases of the digestive system (07/21/2015), Personal history of other diseases of the digestive system (10/21/2015), Personal history of other diseases of the nervous system and sense organs (02/28/2015), Personal history of other diseases of the nervous system and sense organs (08/19/2014), Personal history of other diseases of the nervous system and sense organs (03/16/2019), Personal history of other diseases of the respiratory system (09/13/2014), Personal history of other diseases of the respiratory system (03/21/2016), Personal history of other endocrine, nutritional and metabolic disease (01/18/2018), Personal history of other specified conditions (07/30/2020), Personal history of other specified conditions (02/16/2018), Personal history of other specified conditions (01/09/2014), Right knee pain (05/10/2023), Umbilical hernia with obstruction, without gangrene (08/19/2015), Unspecified abdominal pain (07/21/2015), Unspecified abdominal pain (09/27/2021), Unspecified abdominal pain (09/27/2021), and Unspecified dacryocystitis of right lacrimal passage (02/13/2016).    Past Surgical History:  He has a past surgical history that includes Colonoscopy (12/13/2013); Cystoscopy (12/13/2013); Other surgical history (12/13/2013); Umbilical hernia repair (11/17/2015); Esophagogastroduodenoscopy (05/23/2017); Other surgical history (05/24/2021); Knee arthroscopy w/ meniscal repair; Knee surgery (Left); and Knee arthroscopy w/ partial medial meniscectomy (Right, 09/26/2024).    Social History:  He reports that he has never smoked. He has never used smokeless tobacco. He reports that he does not currently use  alcohol after a past usage of about 2.0 standard drinks of alcohol per week. He reports that he does not use drugs.    Family History:  Family History   Problem Relation Name Age of Onset    Cancer Mother      Heart disease Mother      Kidney cancer Father      Heart disease Father         Allergies:  Cat dander and House dust mite    HYPERTENSION ASSESSMENT  Medication Therapy  Current Medications:   Doxazosin 4 mg once daily at bedtime (also for BPH)    Previous Medications:   Alfuzosin (states switched to Doxazosin to also help with BPH)     Adverse Effects: none reported      Home BP Monitoring  BP Cuff Type:   ReliOn BP Montior - unsure of when purchased   Omron Series 3 purchased within the past month  Cuff Validated? No, here for validation today     Current home BP readings:   Ranging from 136-142/84-87 mmHg   Has been checking with both right and left arms     Previous home BP readings:   132/75   130/83  130/80  122/83  123/80  120/76  135/85  136/78  123/73  128/82     BP Readings from Last 3 Encounters:   12/20/24 158/88   11/25/24 126/84   11/08/24 136/86     Lifestyle  Diet: 2-3 meals per day, will skip either lunch or breakfast  Breakfast: poached egg on spinach greens, yogurt with lokesh and flaxseed and granola, sometimes will have a piece of toast with cheese; 1 cup of coffee if at home per morning, when traveling 2-3 cups of coffee   Lunch: varies, sometimes will have a frozen meal from  [employer], sometimes a salad   Dinner: mix of things, if makes dinner is usually lighter - fish or chicken with a vegetable and carb; eats out 3 meals per week   Snacks: does not typically snack   Drinks: flavored carbonated water, water with lemon, sometimes a light beer   Sodium intake: may add salt to olive oil with bread and will add some salt to that; very rarely has canned foods, will typically do frozen veggies   Physical Activity:   Had knee surgery in October, is still limited but is now able to go to the  [facility] center  Prior to surgery would use the elliptical every other day   When traveling swims at the pool at [facility]    Risk Assessment  Major Risk Factors Include:  Hypertension  Obesity (BMI >30)  Dyslipidemia  Age > 55 (men) or > 65 (women)  Sleep apnea  The 10-year ASCVD risk score (Ish KNOX, et al., 2019) is: 11.7%    Values used to calculate the score:      Age: 64 years      Sex: Male      Is Non- : No      Diabetic: No      Tobacco smoker: No      Systolic Blood Pressure: 158 mmHg      Is BP treated: No      HDL Cholesterol: 64.2 mg/dL      Total Cholesterol: 190 mg/dL  Known target organ damage:  None    Primary Prevention  On Statin?: No, most recent lipid panel was in good range with LDL of 95 mg/dL and TG of 137 mg/dL on 11/13/23  On Aspirin?: Yes, 325 mg once daily   Immunizations Needed: All up-to-date and documented  Tobacco Use: non-smoker     Objective     BP Readings from Last 6 Encounters:   12/20/24 158/88   11/25/24 126/84   11/08/24 136/86   11/06/24 142/78   09/26/24 116/69   07/23/24 138/88     Wt Readings from Last 6 Encounters:   11/25/24 123 kg (272 lb 3.2 oz)   11/08/24 122 kg (269 lb 3.2 oz)   09/26/24 124 kg (274 lb 4 oz)   09/16/24 122 kg (268 lb)   07/23/24 124 kg (273 lb 9.6 oz)   06/03/24 123 kg (271 lb 6.2 oz)     RELEVANT LAB RESULTS  Lab Results   Component Value Date    BILITOT 0.8 12/02/2024    CALCIUM 9.4 12/02/2024    CO2 28 12/02/2024     12/02/2024    CREATININE 0.97 12/02/2024    GLUCOSE 98 12/02/2024    ALKPHOS 54 12/02/2024    K 4.2 12/02/2024    PROT 7.1 12/02/2024     12/02/2024    AST 16 12/02/2024    ALT 19 12/02/2024    BUN 20 12/02/2024    ANIONGAP 9 (L) 12/02/2024    ALBUMIN 4.3 12/02/2024    GFRMALE 87 12/29/2022     Lab Results   Component Value Date    TRIG 132 12/02/2024    CHOL 190 12/02/2024    LDLCALC 99 12/02/2024    HDL 64.2 12/02/2024     Lab Results   Component Value Date    HGBA1C 5.9 (H) 12/02/2024     The 10-year  ASCVD risk score (Ish KNOX, et al., 2019) is: 11.7%    Values used to calculate the score:      Age: 64 years      Sex: Male      Is Non- : No      Diabetic: No      Tobacco smoker: No      Systolic Blood Pressure: 158 mmHg      Is BP treated: No      HDL Cholesterol: 64.2 mg/dL      Total Cholesterol: 190 mg/dL    DRUG INTERACTIONS  Doxazosin x Sildenafil: PDE5 inhibitors can increase the hypotensive effects of doxazosin, monitor     Assessment/Plan   Problem List Items Addressed This Visit       Essential hypertension - Primary     ASSESSMENT OF SMBP MEASUREMENTS  Blood perssures have ranged from 136-142/84-87 mmHg since our last encounter  Dr. Barnett wanted patient to check with his left arm when he saw him last but today patient notes still checking with both left and right arm     Patient's goal BP < 130/80.   Rationale for plan:   Patient's blood pressure has increased since last encounter and is above goal   Dr. Barnett wanted patient to reach out if readings were consistently higher than 130/88 mmHg  Patient does not note any changes to dietary habits or increased stress  He has not been exercising due to knee surgery but is now able to the [employer] to start working out and wants to get back into it regularly   Will hold off on medication changes today, patient encouraged to limit sodium intake and get back into an exercise routine    Medication Changes:  Continue  Doxazosin 4 mg 1 tablet by mouth once daily at bedtime     Future Considerations:  If further blood pressure lowering is required at next follow-up we will start therapy - could add on either ACE-inhibitors, ARBs, calcium channel blockers, or thiazide diuretics     Monitoring and Education Discussed:  Patient informed he should check blood pressure in his left arm moving forward, as Dr. Barnett had instructed him to do at their last encounter, he will continue using his home monitor to check at home   He was instructed to  have BP log on him for our next telephone follow-up  He was encouraged to limit sodium - reduce consumption of processed foods and to not add any salt to meals   He will start getting back into an exercise routing at the [facility] now that he is able to again     We will follow-up in ~1.5 months to see how his blood pressures have been with limiting sodium and increasing exercise. He was encouraged to reach out with any questions and/or concerns prior to then and should also reach out if his blood pressure continues to increase.          Relevant Orders    Referral to Clinical Pharmacy     Pharmacy Follow Up: February 7, 2025    PCP Follow Up: June 2, 2025    Chadwick Garvey, PharmD    Continue all meds under the continuation of care with the referring provider and clinical pharmacy team.

## 2024-12-27 ENCOUNTER — APPOINTMENT (OUTPATIENT)
Dept: PHARMACY | Facility: HOSPITAL | Age: 64
End: 2024-12-27
Payer: COMMERCIAL

## 2024-12-27 DIAGNOSIS — I10 ESSENTIAL HYPERTENSION: Primary | ICD-10-CM

## 2024-12-27 NOTE — ASSESSMENT & PLAN NOTE
ASSESSMENT OF SMBP MEASUREMENTS  Blood perssures have ranged from 136-142/84-87 mmHg since our last encounter  Dr. Barnett wanted patient to check with his left arm when he saw him last but today patient notes still checking with both left and right arm     Patient's goal BP < 130/80.   Rationale for plan:   Patient's blood pressure has increased since last encounter and is above goal   Dr. Barnett wanted patient to reach out if readings were consistently higher than 130/88 mmHg  Patient does not note any changes to dietary habits or increased stress  He has not been exercising due to knee surgery but is now able to the [employer] center to start working out and wants to get back into it regularly   Will hold off on medication changes today, patient encouraged to limit sodium intake and get back into an exercise routine    Medication Changes:  Continue  Doxazosin 4 mg 1 tablet by mouth once daily at bedtime     Future Considerations:  If further blood pressure lowering is required at next follow-up we will start therapy - could add on either ACE-inhibitors, ARBs, calcium channel blockers, or thiazide diuretics     Monitoring and Education Discussed:  Patient informed he should check blood pressure in his left arm moving forward, as Dr. Barnett had instructed him to do at their last encounter, he will continue using his home monitor to check at home   He was instructed to have BP log on him for our next telephone follow-up  He was encouraged to limit sodium - reduce consumption of processed foods and to not add any salt to meals   He will start getting back into an exercise routing at the [facility] center now that he is able to again     We will follow-up in ~1.5 months to see how his blood pressures have been with limiting sodium and increasing exercise. He was encouraged to reach out with any questions and/or concerns prior to then and should also reach out if his blood pressure continues to increase.

## 2025-02-07 ENCOUNTER — APPOINTMENT (OUTPATIENT)
Dept: PHARMACY | Facility: HOSPITAL | Age: 65
End: 2025-02-07
Payer: COMMERCIAL

## 2025-02-07 DIAGNOSIS — I10 ESSENTIAL HYPERTENSION: Primary | ICD-10-CM

## 2025-02-07 ASSESSMENT — ENCOUNTER SYMPTOMS
HEADACHES: 1
HYPERTENSION: 1

## 2025-02-07 NOTE — PROGRESS NOTES
Pharmacist Clinic: Hypertension Management    Marianne Huertas was referred to the Clinical Pharmacy Team for his blood pressure management.    Referring Provider: Dylon Barnett MD     Subjective     Hypertension  This is a chronic problem. The current episode started more than 1 year ago. Associated symptoms include headaches. There are no associated agents to hypertension. Risk factors for coronary artery disease include dyslipidemia, male gender and obesity.     At last pharmacy encounter patient did not have log with him but noted that his BP readings ranged from 136-142/84-87 mmHg. He was also instructed by Dr. Barnett to check BP with his left arm but he had still been checking some with his right arm. He was instructed to check in his left arm moving forward and have BP monitor on him for appointment today. We are following-up today to see how his BP readings have been at home.     Past Medical History:  He has a past medical history of Allergies, Body mass index (BMI) 36.0-36.9, adult (09/15/2018), Chest pain, unspecified (11/09/2018), Class 2 severe obesity with serious comorbidity and body mass index (BMI) of 39.0 to 39.9 in adult (05/12/2023), Closed traumatic dislocation of joint of shoulder region (10/21/2014), Complex tear of medial meniscus of left knee as current injury (05/10/2023), Complex tear of meniscus of right knee as current injury (09/19/2024), Cough, unspecified (01/10/2014), Dislocation of shoulder region, left, initial encounter (05/10/2023), Elevated blood-pressure reading, without diagnosis of hypertension (07/21/2015), Fracture of left shoulder girdle, part unspecified, initial encounter for closed fracture (02/28/2015), GERD (gastroesophageal reflux disease), Gross hematuria (05/10/2023), Herpes zoster (05/10/2023), Male erectile dysfunction, unspecified (11/08/2018), Obstructive sleep apnea (adult) (pediatric) (03/16/2019), Orchitis, right (05/10/2023), Other abnormal glucose  (09/27/2021), Other general symptoms and signs (03/21/2016), Pain in left shoulder (12/01/2014), Pain in right shoulder (12/01/2014), Personal history of other diseases of male genital organs (03/31/2022), Personal history of other diseases of the digestive system (07/21/2015), Personal history of other diseases of the digestive system (10/21/2015), Personal history of other diseases of the nervous system and sense organs (02/28/2015), Personal history of other diseases of the nervous system and sense organs (08/19/2014), Personal history of other diseases of the nervous system and sense organs (03/16/2019), Personal history of other diseases of the respiratory system (09/13/2014), Personal history of other diseases of the respiratory system (03/21/2016), Personal history of other endocrine, nutritional and metabolic disease (01/18/2018), Personal history of other specified conditions (07/30/2020), Personal history of other specified conditions (02/16/2018), Personal history of other specified conditions (01/09/2014), Right knee pain (05/10/2023), Umbilical hernia with obstruction, without gangrene (08/19/2015), Unspecified abdominal pain (07/21/2015), Unspecified abdominal pain (09/27/2021), Unspecified abdominal pain (09/27/2021), and Unspecified dacryocystitis of right lacrimal passage (02/13/2016).    Past Surgical History:  He has a past surgical history that includes Colonoscopy (12/13/2013); Cystoscopy (12/13/2013); Other surgical history (12/13/2013); Umbilical hernia repair (11/17/2015); Esophagogastroduodenoscopy (05/23/2017); Other surgical history (05/24/2021); Knee arthroscopy w/ meniscal repair; Knee surgery (Left); and Knee arthroscopy w/ partial medial meniscectomy (Right, 09/26/2024).    Social History:  He reports that he has never smoked. He has never used smokeless tobacco. He reports that he does not currently use alcohol after a past usage of about 2.0 standard drinks of alcohol per week. He  reports that he does not use drugs.    Family History:  Family History   Problem Relation Name Age of Onset    Cancer Mother      Heart disease Mother      Kidney cancer Father      Heart disease Father         Allergies:  Cat dander and House dust mite    HYPERTENSION ASSESSMENT  Medication Therapy  Current Medications:   Doxazosin 4 mg once daily at bedtime (also for BPH)    Previous Medications:   Alfuzosin (states switched to Doxazosin to also help with BPH)     Adverse Effects: none reported      Home BP Monitoring  BP Cuff Type:   ReliOn BP Montior - unsure of when purchased   Omron Series 3 purchased within the past month  Cuff Validated? No, here for validation today     Current home BP readings:   Date Systolic Diastolic   01/04/25 123 82    123 82   01/06/25 140 88    138 89   01/10/25 133 79    132 82   01/11/25 126 79    129 77   01/13/25 142 86    137 80    121 81    119 71   01/14/25 134 83    130 81   01/16/25 132 86    122 81   01/18/25 125 80    125 83   01/28/25 129 79   01/30/25 140 79    138 83   01/31/25 127 81    125 80   02/01/25 132 82    129 78   02/02/25 134 86    133 81   02/03/25 138 86    136 87     Previous home BP readings:   Ranging from 136-142/84-87    BP Readings from Last 3 Encounters:   12/20/24 158/88   11/25/24 126/84   11/08/24 136/86     Lifestyle  Diet: 2-3 meals per day, will skip either lunch or breakfast  Breakfast: poached egg on spinach greens, yogurt with lokesh and flaxseed and granola, sometimes will have a piece of toast with cheese; 1 cup of coffee if at home per morning, when traveling 2-3 cups of coffee   Lunch: varies, sometimes will have a frozen meal from  [employer], sometimes a salad   Dinner: mix of things, if makes dinner is usually lighter - fish or chicken with a vegetable and carb; eats out 3 meals per week   Snacks: does not typically snack   Drinks: flavored carbonated water, water with lemon, sometimes a light beer   Sodium intake: may add salt to  olive oil with bread and will add some salt to that; very rarely has canned foods, will typically do frozen veggies   Physical Activity:   Using the elliptical every other day  When traveling swims at the pool at [facility]    Risk Assessment  Major Risk Factors Include:  Hypertension  Obesity (BMI >30)  Dyslipidemia  Age > 55 (men) or > 65 (women)  Sleep apnea  The 10-year ASCVD risk score (Ish KNOX, et al., 2019) is: 12.8%    Values used to calculate the score:      Age: 65 years      Sex: Male      Is Non- : No      Diabetic: No      Tobacco smoker: No      Systolic Blood Pressure: 158 mmHg      Is BP treated: No      HDL Cholesterol: 64.2 mg/dL      Total Cholesterol: 190 mg/dL  Known target organ damage:  None    Primary Prevention  On Statin?: No, most recent lipid panel was in good range with LDL of 95 mg/dL and TG of 137 mg/dL on 11/13/23  On Aspirin?: Yes, 325 mg once daily   Immunizations Needed: All up-to-date and documented  Tobacco Use: non-smoker     Objective     BP Readings from Last 6 Encounters:   12/20/24 158/88   11/25/24 126/84   11/08/24 136/86   11/06/24 142/78   09/26/24 116/69   07/23/24 138/88     Wt Readings from Last 6 Encounters:   11/25/24 123 kg (272 lb 3.2 oz)   11/08/24 122 kg (269 lb 3.2 oz)   09/26/24 124 kg (274 lb 4 oz)   09/16/24 122 kg (268 lb)   07/23/24 124 kg (273 lb 9.6 oz)   06/03/24 123 kg (271 lb 6.2 oz)     RELEVANT LAB RESULTS  Lab Results   Component Value Date    BILITOT 0.8 12/02/2024    CALCIUM 9.4 12/02/2024    CO2 28 12/02/2024     12/02/2024    CREATININE 0.97 12/02/2024    GLUCOSE 98 12/02/2024    ALKPHOS 54 12/02/2024    K 4.2 12/02/2024    PROT 7.1 12/02/2024     12/02/2024    AST 16 12/02/2024    ALT 19 12/02/2024    BUN 20 12/02/2024    ANIONGAP 9 (L) 12/02/2024    ALBUMIN 4.3 12/02/2024    GFRMALE 87 12/29/2022     Lab Results   Component Value Date    TRIG 132 12/02/2024    CHOL 190 12/02/2024    LDLCALC 99 12/02/2024     HDL 64.2 2024     Lab Results   Component Value Date    HGBA1C 5.9 (H) 2024     The 10-year ASCVD risk score (Ish KNOX, et al., 2019) is: 12.8%    Values used to calculate the score:      Age: 65 years      Sex: Male      Is Non- : No      Diabetic: No      Tobacco smoker: No      Systolic Blood Pressure: 158 mmHg      Is BP treated: No      HDL Cholesterol: 64.2 mg/dL      Total Cholesterol: 190 mg/dL    DRUG INTERACTIONS  Doxazosin x Sildenafil: PDE5 inhibitors can increase the hypotensive effects of doxazosin, monitor     Assessment/Plan   Problem List Items Addressed This Visit       Essential hypertension - Primary     ASSESSMENT OF SMBP MEASUREMENTS  Highest readin/86 mmHg  Lowest readin/71 mmHg  Average: 131/82 mmHg  Average looking at second readings only: 130/81 mmHg    Patient's goal BP < 130/80.   Rationale for plan:   Patient's blood pressures are averaging just slightly above goal, right around 130/80 mmHg  Dr. Barnett wanted patient to reach out if readings were consistently higher than 130/88 mmHg, most of his readings have been below this  Since his   He has not been exercising due to knee surgery but is now able to exercise more and is using the elliptical every other day   Will hold off on medication changes today since patient is right on the edge of being at goal.     Medication Changes:  Continue  Doxazosin 4 mg 1 tablet by mouth once daily at bedtime     Future Considerations:  If further blood pressure lowering is required at next follow-up or in the future could add on either ACE-inhibitors, ARBs, calcium channel blockers, or thiazide diuretics     Monitoring and Education Discussed:  Patient will continue checking blood pressures on his left arm at home and recording in log   He was instructed to have BP log on him for our next telephone follow-up  He was encouraged to limit sodium and continue working on increasing activity as these will help  continue to improve his blood pressure as well.     We will follow-up in 3 months to see how his blood pressures have been and to assess if he is still mostly in a good range. He was encouraged to reach out with any questions and/or concerns prior to then and should also reach out if his blood pressure continues to increase and are consistently in the upper 130s/upper 80s-90s.         Relevant Orders    Referral to Clinical Pharmacy     Pharmacy Follow Up: May 2, 2025    PCP Follow Up: June 2, 2025    Chdawick Garvey PharmD    Continue all meds under the continuation of care with the referring provider and clinical pharmacy team.

## 2025-02-07 NOTE — ASSESSMENT & PLAN NOTE
ASSESSMENT OF SMBP MEASUREMENTS  Highest readin/86 mmHg  Lowest readin/71 mmHg  Average: 131/82 mmHg  Average looking at second readings only: 130/81 mmHg    Patient's goal BP < 130/80.   Rationale for plan:   Patient's blood pressures are averaging just slightly above goal, right around 130/80 mmHg  Dr. Barnett wanted patient to reach out if readings were consistently higher than 130/88 mmHg, most of his readings have been below this  Since his   He has not been exercising due to knee surgery but is now able to exercise more and is using the elliptical every other day   Will hold off on medication changes today since patient is right on the edge of being at goal.     Medication Changes:  Continue  Doxazosin 4 mg 1 tablet by mouth once daily at bedtime     Future Considerations:  If further blood pressure lowering is required at next follow-up or in the future could add on either ACE-inhibitors, ARBs, calcium channel blockers, or thiazide diuretics     Monitoring and Education Discussed:  Patient will continue checking blood pressures on his left arm at home and recording in log   He was instructed to have BP log on him for our next telephone follow-up  He was encouraged to limit sodium and continue working on increasing activity as these will help continue to improve his blood pressure as well.     We will follow-up in 3 months to see how his blood pressures have been and to assess if he is still mostly in a good range. He was encouraged to reach out with any questions and/or concerns prior to then and should also reach out if his blood pressure continues to increase and are consistently in the upper 130s/upper 80s-90s.

## 2025-03-04 ENCOUNTER — APPOINTMENT (OUTPATIENT)
Dept: INFECTIOUS DISEASES | Facility: CLINIC | Age: 65
End: 2025-03-04
Payer: COMMERCIAL

## 2025-03-04 DIAGNOSIS — Z23 NEED FOR VACCINATION: ICD-10-CM

## 2025-03-04 DIAGNOSIS — Z71.84 COUNSELING FOR TRAVEL: Primary | ICD-10-CM

## 2025-03-04 PROCEDURE — 90471 IMMUNIZATION ADMIN: CPT | Performed by: INTERNAL MEDICINE

## 2025-03-04 PROCEDURE — 99211U01 TRAVEL NURSE VISIT (U01): Performed by: INTERNAL MEDICINE

## 2025-03-04 PROCEDURE — 90690 TYPHOID VACCINE ORAL: CPT | Performed by: INTERNAL MEDICINE

## 2025-03-04 PROCEDURE — 90715 TDAP VACCINE 7 YRS/> IM: CPT | Performed by: INTERNAL MEDICINE

## 2025-03-04 RX ORDER — AZITHROMYCIN 500 MG/1
500 TABLET, FILM COATED ORAL DAILY
Qty: 4 TABLET | Refills: 0 | Status: SHIPPED | OUTPATIENT
Start: 2025-03-04 | End: 2025-03-08

## 2025-03-04 NOTE — PROGRESS NOTES
Pre-Travel Counseling Visit Note:    Marianne Huertas is a 65 y.o.male here for Pre-travel counseling.    They will be visiting:   [place] ([employer])  [place] ([facility])  [place] ([facility])    4/3/25 - 4/19/25    The purpose of this trip is:    Vacation/tourism    Accommodations include:   Hotels    Planned activities include:    Sight seeing      Pertinent medical History:       Past Medical History:   Diagnosis Date    Allergies     Body mass index (BMI) 36.0-36.9, adult 09/15/2018    BMI 36.0-36.9,adult    Chest pain, unspecified 11/09/2018    Chest pain syndrome    Class 2 severe obesity with serious comorbidity and body mass index (BMI) of 39.0 to 39.9 in adult 05/12/2023    Closed traumatic dislocation of joint of shoulder region 10/21/2014    Complex tear of medial meniscus of left knee as current injury 05/10/2023    Complex tear of meniscus of right knee as current injury 09/19/2024    Cough, unspecified 01/10/2014    Cough    Dislocation of shoulder region, left, initial encounter 05/10/2023    Elevated blood-pressure reading, without diagnosis of hypertension 07/21/2015    Elevated blood pressure reading without diagnosis of hypertension    Fracture of left shoulder girdle, part unspecified, initial encounter for closed fracture 02/28/2015    Fracture of bone of shoulder, left, closed, initial encounter    GERD (gastroesophageal reflux disease)     Gross hematuria 05/10/2023    Herpes zoster 05/10/2023    Male erectile dysfunction, unspecified 11/08/2018    Male erectile disorder of organic origin    Obstructive sleep apnea (adult) (pediatric) 03/16/2019    Obstructive sleep apnea on CPAP    Orchitis, right 05/10/2023    Other abnormal glucose 09/27/2021    Hemoglobin A1c above reference range    Other general symptoms and signs 03/21/2016    Flu-like symptoms    Pain in left shoulder 12/01/2014    Left shoulder pain    Pain in right shoulder 12/01/2014    Right shoulder pain    Personal  history of other diseases of male genital organs 03/31/2022    History of epididymitis    Personal history of other diseases of the digestive system 07/21/2015    History of constipation    Personal history of other diseases of the digestive system 10/21/2015    History of umbilical hernia    Personal history of other diseases of the nervous system and sense organs 02/28/2015    History of carpal tunnel syndrome    Personal history of other diseases of the nervous system and sense organs 08/19/2014    History of otitis media    Personal history of other diseases of the nervous system and sense organs 03/16/2019    History of sleep apnea    Personal history of other diseases of the respiratory system 09/13/2014    Personal history of asthma    Personal history of other diseases of the respiratory system 03/21/2016    History of acute sinusitis    Personal history of other endocrine, nutritional and metabolic disease 01/18/2018    History of obesity    Personal history of other specified conditions 07/30/2020    History of gross hematuria    Personal history of other specified conditions 02/16/2018    History of snoring    Personal history of other specified conditions 01/09/2014    History of headache    Right knee pain 05/10/2023    Umbilical hernia with obstruction, without gangrene 08/19/2015    Incarcerated umbilical hernia    Unspecified abdominal pain 07/21/2015    Abdominal cramping    Unspecified abdominal pain 09/27/2021    Abdominal wall pain    Unspecified abdominal pain 09/27/2021    Abdominal wall pain    Unspecified dacryocystitis of right lacrimal passage 02/13/2016    Dacrocystitis, right     They have received the following Travel vaccinations:    HAV x 2    They have received treatment for the following travel related illnesses in the past: None      ORDERS for this consultation visit:    Problem List Items Addressed This Visit       Counseling for travel    -  Primary    During the travel  "consultation, discussions included food and water/fluid precautions, insect repellants/precautions, vaccinations, as well as other health related questions.  A summary of discussion was included in the patient \"wrap-up\"/instructions sheets as well as in supplemental information from [employer] supplied at the visit.         Relevant Medications    azithromycin (Zithromax) 500 mg tablet    Other Relevant Orders    Typhoid vaccine oral (Completed)    Need for vaccination        Relevant Orders    Tdap vaccine, age 7 years and older (ADACEL) (Completed)                  "

## 2025-03-06 NOTE — PROGRESS NOTES
"Procedure: Right knee arthroscopic partial medial meniscectomy  Date of procedure: 9/26/2024    HPI:   Marianne returns for follow up status post knee arthroscopy. Pain is mild. Narcotics are no longer required. The patient is able to ambulate with an assistive device. Home exercises have been performed. Incisions are benign.  He notes difficulty when flexing his knee.  He describes it as being \"tight\".    ROS  Constitutional: No fever, no chills, not feeling tired, no recent weight gain and no recent weight loss  ENT: No nosebleeds  Cardiovascular: No chest pain  Respiratory: No shortness of breath and no cough  Gastrointestinal: No abdominal pain, no nausea, no diarrhea, and no vomiting  Musculoskeletal: No arthralgias  Integumentary: No rashes and no skin lesions  Neurological: No headache  Psychiatric: No sleep disturbances no depression  Endocrine: No muscle weakness and no muscle cramps  Hematologic/lymphatic: No swelling glands and no tendency for easy bruising    Past Medical History:   Diagnosis Date    Allergies     Body mass index (BMI) 36.0-36.9, adult 09/15/2018    BMI 36.0-36.9,adult    Chest pain, unspecified 11/09/2018    Chest pain syndrome    Cough, unspecified 01/10/2014    Cough    Elevated blood-pressure reading, without diagnosis of hypertension 07/21/2015    Elevated blood pressure reading without diagnosis of hypertension    Fracture of left shoulder girdle, part unspecified, initial encounter for closed fracture 02/28/2015    Fracture of bone of shoulder, left, closed, initial encounter    GERD (gastroesophageal reflux disease)     Male erectile dysfunction, unspecified 11/08/2018    Male erectile disorder of organic origin    Obstructive sleep apnea (adult) (pediatric) 03/16/2019    Obstructive sleep apnea on CPAP    Other abnormal glucose 09/27/2021    Hemoglobin A1c above reference range    Other general symptoms and signs 03/21/2016    Flu-like symptoms    Pain in left shoulder " 12/01/2014    Left shoulder pain    Pain in right shoulder 12/01/2014    Right shoulder pain    Personal history of other diseases of male genital organs 03/31/2022    History of epididymitis    Personal history of other diseases of the digestive system 07/21/2015    History of constipation    Personal history of other diseases of the digestive system 10/21/2015    History of umbilical hernia    Personal history of other diseases of the nervous system and sense organs 02/28/2015    History of carpal tunnel syndrome    Personal history of other diseases of the nervous system and sense organs 08/19/2014    History of otitis media    Personal history of other diseases of the nervous system and sense organs 03/16/2019    History of sleep apnea    Personal history of other diseases of the respiratory system 09/13/2014    Personal history of asthma    Personal history of other diseases of the respiratory system 03/21/2016    History of acute sinusitis    Personal history of other endocrine, nutritional and metabolic disease 01/18/2018    History of obesity    Personal history of other specified conditions 07/30/2020    History of gross hematuria    Personal history of other specified conditions 02/16/2018    History of snoring    Personal history of other specified conditions 01/09/2014    History of headache    Umbilical hernia with obstruction, without gangrene 08/19/2015    Incarcerated umbilical hernia    Unspecified abdominal pain 07/21/2015    Abdominal cramping    Unspecified abdominal pain 09/27/2021    Abdominal wall pain    Unspecified abdominal pain 09/27/2021    Abdominal wall pain    Unspecified dacryocystitis of right lacrimal passage 02/13/2016    Dacrocystitis, right        Past Surgical History:   Procedure Laterality Date    COLONOSCOPY  12/13/2013    Complete Colonoscopy    CYSTOSCOPY  12/13/2013    Diagnostic Cystoscopy    ESOPHAGOGASTRODUODENOSCOPY  05/23/2017    Diagnostic Esophagogastroduodenoscopy     KNEE ARTHROSCOPY W/ MENISCAL REPAIR      KNEE ARTHROSCOPY W/ PARTIAL MEDIAL MENISCECTOMY Right 09/26/2024    RIGHT Knee Partial Medial Meniscectomy    KNEE SURGERY Left     OTHER SURGICAL HISTORY  12/13/2013    Neuroplasty With Transposition Of Ulnar Nerve - At Elbow    OTHER SURGICAL HISTORY  05/24/2021    Hernia Repair Inguinal Unilateral    UMBILICAL HERNIA REPAIR  11/17/2015    Umbilical Hernia Repair          Current Outpatient Medications:     aspirin 325 mg EC tablet, Take 1 tablet (325 mg) by mouth once daily. Do not fill before September 27, 2024., Disp: 15 tablet, Rfl: 0    cholecalciferol (Vitamin D-3) 25 MCG (1000 UT) tablet, Take 2 tablets (2,000 Units) by mouth once daily., Disp: , Rfl:     docusate sodium (Colace) 100 mg capsule, Take 1 capsule (100 mg) by mouth 2 times a day for 15 days., Disp: 30 capsule, Rfl: 0    doxazosin (Cardura) 4 mg tablet, Take 1 tablet (4 mg) by mouth once daily at bedtime., Disp: 90 tablet, Rfl: 3    fexofenadine (Allegra) 180 mg tablet, Take by mouth., Disp: , Rfl:     fluticasone (Flonase) 50 mcg/actuation nasal spray, Administer into affected nostril(s)., Disp: , Rfl:     multivitamin tablet, Take 1 tablet by mouth once daily., Disp: , Rfl:     pantoprazole (ProtoNix) 40 mg EC tablet, Take 1 tablet (40 mg) by mouth once daily as needed., Disp: , Rfl:     polyethylene glycol (Glycolax) 17 gram/dose powder, Take by mouth., Disp: , Rfl:     sildenafil (Viagra) 50 mg tablet, Take by mouth., Disp: , Rfl:      Allergies   Allergen Reactions    Cat Dander Unknown    House Dust Mite Other        Social Connections: Not on file         Physical exam:  64-year-old male well appearing in no acute distress  Right knee incisions are healed with sutures in place. No erythema or drainage.  Moderate swelling. Sutures are removed and steri strips applied without complication.  Quadriceps strength is improving with slight atrophy.  Able to perform active straight leg raise.  Range of  motion 0-45°.  Bilateral lower extremity compartments soft and supple.  Intact bilateral lower extremity neurovascular exam.    Diagnosis:  Right knee medial meniscus tear    Plan:  1. The surgical details were reviewed with the patient. Questions were answered regarding the procedure.  2. Physical therapy is prescribed for postoperative knee exercises, edema control, and lower extremity strength. Activity restrictions outlined.  Keep activity level to day-to-day activities for the next 2 to 3 weeks.    3. Continue to ice the knee. Utilize NSAIDs as needed for pain control and swelling. Monitor for GI distress. It is ok to shower. Avoid submerging the knee under water for another 2 weeks.  4.  He is going to begin using a compression sleeve to help reduce swelling.  5.  He is traveling [place] in a couple weeks.  We discussed steps that he can do to help minimize risk of DVT including use of compression stockings, mobilization, and aspirin.  6. Follow up 4weeks. The patient agrees with this plan.    This office note was dictated using Dragon voice to text software and was not proofread for spelling or grammatical errors       [de-identified] : JAMIE PRATT  has R hip impingement and has tried and failed conservative treatment.   We discussed surgery in the form of hip arthroscopy today.  Risks related to hip arthroscopy include but are not limited to surgical site infection, damage to neurovascular structures around the hip, persistent pain, damage to intraarticular structures, scar tissue and adhesion formation, heterotopic bone formation post operative blood clot and the need for post operative blood thinners.  We discussed the requirement for a prolonged period of limited weight bearing on crutches post operatively and the importance of physical therapy.  he  was able to state understanding of the above and was agreeable to proceed with surgery.  Prior to appointment and during encounter with patient extensive medical records were reviewed including but not limited to, hospital records, out patient records, imaging results, and lab data. During this appointment the patient was examined, diagnoses were discussed and explained in a face to face manner. In addition extensive time was spent reviewing aforementioned diagnostic studies. Counseling including abnormal image results, differential diagnoses, treatment options, risk and benefits, lifestyle changes, current condition, and current medications was performed. Patient's comments, questions, and concerns were address and patient verbalized understanding.

## 2025-04-21 ENCOUNTER — APPOINTMENT (OUTPATIENT)
Dept: PRIMARY CARE | Facility: CLINIC | Age: 65
End: 2025-04-21
Payer: COMMERCIAL

## 2025-04-21 VITALS
HEART RATE: 74 BPM | BODY MASS INDEX: 38.27 KG/M2 | HEIGHT: 71 IN | RESPIRATION RATE: 16 BRPM | WEIGHT: 273.4 LBS | SYSTOLIC BLOOD PRESSURE: 134 MMHG | OXYGEN SATURATION: 95 % | DIASTOLIC BLOOD PRESSURE: 82 MMHG | TEMPERATURE: 97.4 F

## 2025-04-21 DIAGNOSIS — H66.91 RIGHT OTITIS MEDIA, UNSPECIFIED OTITIS MEDIA TYPE: Primary | ICD-10-CM

## 2025-04-21 DIAGNOSIS — J34.89 NOSE PAIN: ICD-10-CM

## 2025-04-21 PROCEDURE — 3008F BODY MASS INDEX DOCD: CPT | Performed by: STUDENT IN AN ORGANIZED HEALTH CARE EDUCATION/TRAINING PROGRAM

## 2025-04-21 PROCEDURE — 1036F TOBACCO NON-USER: CPT | Performed by: STUDENT IN AN ORGANIZED HEALTH CARE EDUCATION/TRAINING PROGRAM

## 2025-04-21 PROCEDURE — 99214 OFFICE O/P EST MOD 30 MIN: CPT | Performed by: STUDENT IN AN ORGANIZED HEALTH CARE EDUCATION/TRAINING PROGRAM

## 2025-04-21 PROCEDURE — 3075F SYST BP GE 130 - 139MM HG: CPT | Performed by: STUDENT IN AN ORGANIZED HEALTH CARE EDUCATION/TRAINING PROGRAM

## 2025-04-21 PROCEDURE — 3079F DIAST BP 80-89 MM HG: CPT | Performed by: STUDENT IN AN ORGANIZED HEALTH CARE EDUCATION/TRAINING PROGRAM

## 2025-04-21 RX ORDER — AMOXICILLIN AND CLAVULANATE POTASSIUM 875; 125 MG/1; MG/1
875 TABLET, FILM COATED ORAL 2 TIMES DAILY
Qty: 14 TABLET | Refills: 0 | Status: SHIPPED | OUTPATIENT
Start: 2025-04-21 | End: 2025-04-28

## 2025-04-21 ASSESSMENT — ENCOUNTER SYMPTOMS
NAUSEA: 0
LOSS OF SENSATION IN FEET: 0
DIARRHEA: 0
DIAPHORESIS: 0
DIZZINESS: 0
OCCASIONAL FEELINGS OF UNSTEADINESS: 0
FEVER: 0
SHORTNESS OF BREATH: 0
VOMITING: 0
CHILLS: 0
DEPRESSION: 0
LIGHT-HEADEDNESS: 0
RHINORRHEA: 0
HEADACHES: 0
WEAKNESS: 0
NUMBNESS: 0

## 2025-04-21 ASSESSMENT — PATIENT HEALTH QUESTIONNAIRE - PHQ9
SUM OF ALL RESPONSES TO PHQ9 QUESTIONS 1 AND 2: 0
1. LITTLE INTEREST OR PLEASURE IN DOING THINGS: NOT AT ALL
2. FEELING DOWN, DEPRESSED OR HOPELESS: NOT AT ALL

## 2025-04-21 ASSESSMENT — PAIN SCALES - GENERAL: PAINLEVEL_OUTOF10: 1

## 2025-04-21 NOTE — PROGRESS NOTES
Subjective   Patient ID: Marianne Huertas is a 65 y.o. male who presents for Earache (Earache in both ears x 10 days /Patient states that he took an antibiotic Azithromycin 3 days Rx).  History of Present Illness  Marianne Huertas is a 65-year-old male who presents with bilateral ear pain.    He began experiencing bilateral ear pain approximately four to five days into a thirteen-day overseas cruise in [place]. The pain initially started in the right ear and then progressed to the left ear. Despite attempts to manage the pain by cleaning his ears, the discomfort persisted throughout the cruise. He self-administered azithromycin, which he had brought with him, and noted some improvement, although he is unsure if the infection was completely resolved due to the short three-day course of the antibiotic.     Approximately three days after the onset of the earache, he developed a cold with a sore throat, which has since resolved. The ear pain subsided completely two days ago but recurred yesterday in the right ear, and today he is experiencing only mild discomfort in the right ear. No ear discharge, hearing or vision loss, fever, chills, or sweats.    Approximately one month ago, he experienced a fall while walking in [place], resulting in a direct impact on his nose. He reports tenderness in the nose area. He did not seek immediate medical attention as there was no swelling or visible deformity, and he did not lose consciousness during the fall. The tenderness in the nose persists, especially when he manipulates the area or blows his nose, although it is not painful at rest. He experienced epistaxis for one day following the fall and it has not recurred. No headache.     He has been using allergy medications, including Flonase and Allegra, consistently, except for missing a dose today. He uses Flonase as two sprays in each nostril after showering. No shortness of breath, chest pain, nausea, vomiting, diarrhea  "(except for a brief episode related to consuming potentially contaminated fruit), numbness, tingling, weakness, or headaches at present.    Review of Systems   Constitutional:  Negative for chills, diaphoresis and fever.   HENT:  Positive for ear pain. Negative for ear discharge, hearing loss, postnasal drip and rhinorrhea.    Eyes:  Negative for visual disturbance.   Respiratory:  Negative for shortness of breath.    Cardiovascular:  Negative for chest pain.   Gastrointestinal:  Negative for diarrhea, nausea and vomiting.   Neurological:  Negative for dizziness, syncope, weakness, light-headedness, numbness and headaches.       Objective     /82 (BP Location: Left arm, Patient Position: Sitting, BP Cuff Size: Adult)   Pulse 74   Temp 36.3 °C (97.4 °F) (Skin)   Resp 16   Ht 1.791 m (5' 10.5\")   Wt 124 kg (273 lb 6.4 oz)   SpO2 95%   BMI 38.67 kg/m²      Physical Exam  GENERAL: Alert, cooperative, well developed, no acute distress, no diaphoresis.  HEENT: Normocephalic, normal oropharynx, moist mucous membranes, ear canals and tympanic membrane on right opaque and normal on left, no cerumen, no oral lesions, no oral erythema. No face tenderness around maxillary region no nares or at site of pain whatsoever. Skin appears normal on face.   NECK: Supple, trachea midline, no cervical lymphadenopathy.  CHEST: Clear to auscultation bilaterally, no wheezes, rhonchi, or rales.  CARDIOVASCULAR: Normal heart rate and rhythm, S1 and S2 normal without murmurs, rubs, or gallops.  EXTREMITIES: No cyanosis or edema.  NEUROLOGICAL: Moves all extremities without gross motor deficit. Cranial nerves II-XII grossly intact. Proprioception intact bilaterally. Muscle strength 5/5 in bilateral upper and lower extremities. Sensation grossly intact bilaterally.    Assessment & Plan  Bilateral ear pain  ?Right otitis media  Intermittent bilateral ear pain, initially in the right ear during an overseas trip. Symptoms improved with " azithromycin but recurred, primarily in the right ear. No discharge, hearing loss, or systemic symptoms. Examination shows eardrum opacity, suggesting infection or congestion. Empiric diagnosis of inner ear infection considered.  - Prescribe Augmentin for suspected inner ear infection.  - Educate on potential side effects of Augmentin, including diarrhea, and suggest yogurt or probiotics to mitigate gastrointestinal side effects.  - Advise to start antibiotics now to clear potential infection before travel.  - Monitor for red flag symptoms: fever, chills, sweats, severe ear pain and to seek emergency care if worsening.     Nasal tenderness post-fall  Persistent nasal tenderness following a fall one month ago. No swelling or visible deformity. Tenderness persists with manipulation or blowing the nose. Differential includes cartilage bruise or possible fracture, though fracture suspicion is low. No neurological deficits. Symptoms have improved significantly and nearly resolved.   - Monitor symptoms for resolution and would anticipate symptom resolution in very near future. If persistent let us know.   - Educate on red flag symptoms: worsening pain, vision or hearing loss, numbness, tingling, weakness, slurred speech, facial droop, headache.  - Discussed head CT to rule out fracture, but discussed with patient and deferred due to low suspicion and lack of any pain.    F/u pcp as previously scheduled, sooner prn.       Results         Daniel Barker DO     This medical note was created with the assistance of artificial intelligence (AI) for documentation purposes. The content has been reviewed and confirmed by the healthcare provider for accuracy and completeness. Patient consented to the use of audio recording and use of AI during their visit.

## 2025-05-02 ENCOUNTER — APPOINTMENT (OUTPATIENT)
Dept: PHARMACY | Facility: HOSPITAL | Age: 65
End: 2025-05-02
Payer: COMMERCIAL

## 2025-05-31 LAB
ANION GAP SERPL CALCULATED.4IONS-SCNC: 8 MMOL/L (CALC) (ref 7–17)
BUN SERPL-MCNC: 18 MG/DL (ref 7–25)
BUN/CREAT SERPL: ABNORMAL (CALC) (ref 6–22)
CALCIUM SERPL-MCNC: 8.9 MG/DL (ref 8.6–10.3)
CHLORIDE SERPL-SCNC: 105 MMOL/L (ref 98–110)
CO2 SERPL-SCNC: 23 MMOL/L (ref 20–32)
CREAT SERPL-MCNC: 0.99 MG/DL (ref 0.7–1.35)
EGFRCR SERPLBLD CKD-EPI 2021: 86 ML/MIN/1.73M2
EST. AVERAGE GLUCOSE BLD GHB EST-MCNC: 128 MG/DL
EST. AVERAGE GLUCOSE BLD GHB EST-SCNC: 7.1 MMOL/L
GLUCOSE SERPL-MCNC: 101 MG/DL (ref 65–99)
HBA1C MFR BLD: 6.1 %
POTASSIUM SERPL-SCNC: 4.1 MMOL/L (ref 3.5–5.3)
SODIUM SERPL-SCNC: 136 MMOL/L (ref 135–146)

## 2025-06-02 ENCOUNTER — APPOINTMENT (OUTPATIENT)
Dept: PRIMARY CARE | Facility: CLINIC | Age: 65
End: 2025-06-02
Payer: COMMERCIAL

## 2025-06-02 DIAGNOSIS — R73.03 PREDIABETES: ICD-10-CM

## 2025-06-02 DIAGNOSIS — G47.33 OBSTRUCTIVE SLEEP APNEA ON CPAP: ICD-10-CM

## 2025-06-02 DIAGNOSIS — J30.1 SEASONAL ALLERGIC RHINITIS DUE TO POLLEN: ICD-10-CM

## 2025-06-02 DIAGNOSIS — E55.9 VITAMIN D DEFICIENCY: ICD-10-CM

## 2025-06-02 DIAGNOSIS — I10 ESSENTIAL HYPERTENSION: Primary | ICD-10-CM

## 2025-06-02 DIAGNOSIS — E78.5 HYPERLIPIDEMIA, UNSPECIFIED HYPERLIPIDEMIA TYPE: ICD-10-CM

## 2025-06-02 PROCEDURE — 3079F DIAST BP 80-89 MM HG: CPT | Performed by: INTERNAL MEDICINE

## 2025-06-02 PROCEDURE — 3074F SYST BP LT 130 MM HG: CPT | Performed by: INTERNAL MEDICINE

## 2025-06-02 PROCEDURE — 99214 OFFICE O/P EST MOD 30 MIN: CPT | Performed by: INTERNAL MEDICINE

## 2025-06-02 PROCEDURE — 3008F BODY MASS INDEX DOCD: CPT | Performed by: INTERNAL MEDICINE

## 2025-06-02 ASSESSMENT — ENCOUNTER SYMPTOMS
DEPRESSION: 0
LOSS OF SENSATION IN FEET: 0
OCCASIONAL FEELINGS OF UNSTEADINESS: 0

## 2025-06-02 NOTE — PROGRESS NOTES
"Subjective   Patient ID: Marianne Huertas is a 65 y.o. male who presents for Follow-up.    Here for semiannual follow-up  Overall doing fairly well.  He has been following his blood pressures at home and brings in those values to review;  Systolic readings 119, 123, 137, 134, 143, 132, 120, 121, 137, 128    He had an injury earlier this spring-he stepped into a gutter he states and fell forward with a nose injury.  Fortunately improved he had a bit of a headache.  Improved         Review of Systems    Objective   /84 (BP Location: Right arm, Patient Position: Sitting)   Pulse 71   Ht 1.791 m (5' 10.5\")   Wt 123 kg (271 lb)   SpO2 97%   BMI 38.33 kg/m²     Physical Exam  Vitals reviewed.   Constitutional:       Appearance: Normal appearance.   HENT:      Head: Normocephalic and atraumatic.   Eyes:      General: No scleral icterus.        Right eye: No discharge.         Left eye: No discharge.      Extraocular Movements: Extraocular movements intact.      Conjunctiva/sclera: Conjunctivae normal.      Pupils: Pupils are equal, round, and reactive to light.   Cardiovascular:      Rate and Rhythm: Normal rate and regular rhythm.      Pulses: Normal pulses.      Heart sounds: Normal heart sounds. No murmur heard.  Pulmonary:      Effort: Pulmonary effort is normal.      Breath sounds: Normal breath sounds. No wheezing or rhonchi.   Musculoskeletal:         General: No deformity or signs of injury. Normal range of motion.      Cervical back: Normal range of motion and neck supple. No rigidity or tenderness.   Lymphadenopathy:      Cervical: No cervical adenopathy.   Skin:     General: Skin is warm and dry.      Findings: No rash.   Neurological:      General: No focal deficit present.      Mental Status: He is alert and oriented to person, place, and time. Mental status is at baseline.      Cranial Nerves: No cranial nerve deficit.      Sensory: No sensory deficit.      Gait: Gait normal.   Psychiatric:    "      Mood and Affect: Mood normal.         Behavior: Behavior normal.         Thought Content: Thought content normal.         Judgment: Judgment normal.         Assessment/Plan   Problem List Items Addressed This Visit           ICD-10-CM    Allergic rhinitis due to pollen J30.1    Essential hypertension - Primary I10    Relevant Orders    Comprehensive Metabolic Panel    Obstructive sleep apnea on CPAP G47.33    Prediabetes R73.03    Relevant Orders    Comprehensive Metabolic Panel    Hemoglobin A1C    Vitamin D deficiency (Chronic) E55.9    Relevant Orders    Vitamin D 25-Hydroxy,Total (for eval of Vitamin D levels)     Other Visit Diagnoses         Codes      Hyperlipidemia, unspecified hyperlipidemia type     E78.5    Relevant Orders    Lipid Panel    TSH with reflex to Free T4 if abnormal                Portions of this encounter note have been copied from my previous note dated 11/25/2024, which have been updated where appropriate and all reflect my current medical decision making from today.     Labs from 5/31/25- rev'd      Recent fall-he fell earlier this spring improving, after stepping in a gutter and falling forward on the sidewalk hitting his nose.  Fortunately improved      Knee pain-bilateral-recurrent-  Hx  Left knee meniscal tear 10/22-requiring arthroscopy with Dr. Tello late February 2023  s/p right knee arthroscopy 9/26/2024 with Dr. Tello.    Improved function and less pain so far          11/24-he notes some residual stiffness-he will continue PT and he plans to [place] back with Dr. Fitzgerald to consider a gel injection.  He notes there is some residual arthritis within the knee             6/25-scheduled to see Ortho June 4 for bilateral knee gel injections        10-year cardiac risk September 20 21- 6.4%-he understands he will likely need a statin at some point over the next several years.    calcium score = zero March 2018   We will continue to reassess annually. Weight loss would be in his best  interest as well as exercise consistently     History of Atypical chest pain syndrome- September 2018- this came on when he was reaching with his left arm and resolve spontaneously after 5 hours. Evaluation in the emergency room showed no pulmonary embolus did have some pleuritic component with the pain worsening with deep inspiration. Cardiac enzymes negative. Presently he has no musculoskeletal or respiratory symptoms. Will defer further evaluation presently.             No recurrence     Hypertension- blood pressure improved. He will continue weight loss efforts and doxazosin. Blood pressure improved on recheck          11/24-he met with our pharmacist who found his home machine correlates.  Interesting the blood pressure is slightly high in the left arm than the right arm.  He will follow blood pressure in his left arm at home and notify me if the average is not consistently under 130/88.           6/25-Home blood pressures followed-the average comes up to 129.4.  He will continue weight loss efforts-will follow the blood pressure in his left arm which is typically good in his right arm, and he will notify me if the averages consistently above 130/88.     Prediabetes/elevated weight with BMI over 30 - in the past, discussed swimming and weight loss efforts in the past. In the past, encouraged to follow up with a dietician when he is back in town to see what foods he could eat.                11/23-BMI 39.6.  A1c 6%-stable he will continue his diet and exercise efforts              6/24-BMI 38.9.  A1c 5.9%-slightly improved weight is down 5 pounds in 6 months.  He will continue efforts.               11/24-BMI 38.5.  Awaiting fasting labs               6/25-BMI 38.3.  A1c 6.1%.  Will continue to follow.  He understands importance of optimizing diet, fitness and weight loss measures     Exercise routine - before knee injury elliptical at home-3 times weekly-he also walks the dogs daily. Suggested he try to advance  his elliptical intensity          He plans to resume his shorter walks and elliptical workouts this winter           With his knees-his exercise routine has been somewhat limited       Hx Headaches-worsened since his late September right knee arthroscopy-he had for some time been having headaches typically once a week-he was having rather severe daily headaches-they have improved a bit with cessation of the ibuprofen.  He checked his home carbon monoxide detector.  He will continue to look for triggers.  He will optimize his sleep and check a CAT scan of the head.  He will continue his allergy plan including Allegra and Flonase.  Of note he is allergic to dogs and he has a new puppy             11/24-his headaches have persisted.  We spoke at length.  He has a long history of headaches-extending back into his 20s.  Presently his headaches seem to have changed in character.  Head CT unremarkable.  He will continue to watch for triggers.  He will double check his CPAP settings with a visit to the sleep medicine team.  He will also set up allergy evaluation.  He does not think there is any issues with teeth grinding or bruxism, nor does he have TMJ symptoms.  He does not have cervical spine issues.  No history of trauma or head injuries.  He will try extra strength Excedrin.  He will continue to work to keep a diary of potential triggers or symptoms associated with his headaches and set up a neurology consultation.  He will check additional labs including screening for vasculitis with ESR CRP.  He would like a B12 level checked as well.  Ordered             6/25-headaches have improved    Tinnitus-history and exam nondiagnostic.  He avoids loud noises.  Discussed white music at night, and if not improved follow-up with ENT.            11/24-low-grade tinnitus has persisted-he will set up ENT evaluation          Hx Left nipple sensitivity-6/24-not improved with the topical steroid provided by his dermatologist  recently-exam unremarkable.  Discussed the possibility that with the asymmetry, the left nipple may be some irritation or abrasion that the right nipple has not experienced.  He will watch DNI to make sure that there is no inadvertent trauma to this area.  He will notify me over the next several weeks if not improved.            11/24-gynecomastia evaluation completed/nondiagnostic.  Suspect physiologic finding.             Clinically improved    Perennial allergic rhinitis/reactive airway disease/recent cough-he will continue fluticasone and Allegra daily-especially with spring pollen anticipated   Discussed upcoming spring season-he will have his medicines on hand as needed          11/24-with his headache history-allergy consultation ordered            6/25-he has not yet seen the allergist     Sleep apnea- CPAP nightly continues. no issues. He has one he travels w/ unit too           11/24-it has been years since his CPAP settings were last checked.  In light of his low-grade headache history-he will set up a appointment with the sleep medicine team to reevaluate help with this.              6/25-he has not recently seen a sleep provider.       Right lower abdominal wall discomfort-occasionally recurrent-          not recently problematic     Remote right inguinal hernia repair-as an infant-no obvious hernia on past recheck.     Acid reflux/dyspepsia/hiatal hernia- EGD updated 2014- esophagitis- Prevacid helped-he is off of this presently. Certain foods cause abdominal symptoms and he tends to avoid these. Less symptoms when he does not eat is late in the evening. No swallowing discomfort encouraged him to travel with this and use this with any recurrent abdominal acid symptoms. Pepcid used as needed as well.            Presently generally controlled for the most part     IBS- he'll has pain just left of the umbilical area, evaluated in GI by Dr. Espinoza in the past. long-standing abdominal bloating with certain  foods Now Pepcid as needed for breakthrough symptoms in the evening. Benefiber used consistently & MiraLAX daily. In the past suggested probiotic daily.            Overall with better diet and weight loss, using his antacid far less frequently, typically just before spicy meals.  He avoids onions as well    Tubular adenomatous colon polyp-noted on 12/24 colonoscopy.             Next colonoscopy recommended 5 years-12/29.                          Urology care with past right orchitis / Hx epididymitis/gross hematuria/BPH/ED/urology care/family history of bladder cancer-father-recent cystoscopy and CT urogram per Dr. Shah.             11/23-PSA stable.  He recently had a bout of left testicular pain diagnosed as epididymitis in urgent care.  He received 7 days of Levaquin-still some residual symptoms.  He will take 3 more days of Levaquin and discussed with Dr. Shah who he coincidentally sees later this week.              6/24-PSA normal.                11/24-sildenafil refilled.  He is scheduled to see Dr. Shah 12/24 with a PSA before             6/25-scheduled to see Dr. Shah soon-6/20/2025                  History of umbilical hernia repair-Asymptomatic        History of left shoulder dislocation-he saw Dr. Chichendance; surgery not needed. Asymptomatic presently.      Bilateral plantar fasciitis- occas an issue. insoles helpful     Remote left elbow ulnar transposition surgery/bilateral carpal tunnel symptoms-although an EMG did not necessarily show carpal tunnel he states, he uses wrist splints most nights which help hand tingling during the daytime. Encouraged optimal position including keeping his elbow straight at night as he has mainly fourth and fifth finger tingling symptoms more suggestive of ulnar issues        Skin care -family history of melanoma-father--he will continue to see Dr. Martins annually; no concerns     Dental care-encouraged semiannual dental visits.             6/25-dental exams  up-to-date     Dry eyes / Vision care-vision visits encouraged at least biannually. updated 2017. Reading glasses sufficient. May need an update to his spectacles for distance.             6/25-vision visits up-to-date        Flu shot each fall-updated 10/24     Covid series completed.  Additional booster 10/24     Shingrix- updated 12/19.    RSV-he will get that soon    Tdap booster-will be needed before 11/25     Follow-up semiannually or sooner as needed     Charting was completed using voice recognition technology and may include unintended errors.

## 2025-06-04 ENCOUNTER — OFFICE VISIT (OUTPATIENT)
Dept: ORTHOPEDIC SURGERY | Facility: HOSPITAL | Age: 65
End: 2025-06-04
Payer: COMMERCIAL

## 2025-06-04 DIAGNOSIS — M17.0 BILATERAL PRIMARY OSTEOARTHRITIS OF KNEE: Primary | ICD-10-CM

## 2025-06-04 PROCEDURE — 20610 DRAIN/INJ JOINT/BURSA W/O US: CPT | Mod: 50 | Performed by: PHYSICIAN ASSISTANT

## 2025-06-04 PROCEDURE — 1036F TOBACCO NON-USER: CPT | Performed by: PHYSICIAN ASSISTANT

## 2025-06-04 PROCEDURE — 2500000004 HC RX 250 GENERAL PHARMACY W/ HCPCS (ALT 636 FOR OP/ED): Mod: JZ | Performed by: PHYSICIAN ASSISTANT

## 2025-06-04 RX ADMIN — Medication 48 MG: at 08:53

## 2025-06-04 ASSESSMENT — PAIN SCALES - GENERAL: PAINLEVEL_OUTOF10: 3

## 2025-06-04 ASSESSMENT — PAIN - FUNCTIONAL ASSESSMENT: PAIN_FUNCTIONAL_ASSESSMENT: 0-10

## 2025-06-04 NOTE — PROGRESS NOTES
Chief Complaint:  Injection bilateral knee with Synvisc 1    Subjective:  Marianne presents for their Synvisc 1 injection to their bilateral knee.    ROS  Constitutional: No fever, no chills, not feeling tired, no recent weight gain and no recent weight loss  ENT: No nosebleeds  Cardiovascular: No chest pain  Respiratory: No shortness of breath and no cough  Gastrointestinal: No abdominal pain, no nausea, no diarrhea, and no vomiting  Musculoskeletal: No arthralgias  Integumentary: No rashes and no skin lesions  Neurological: No headache  Psychiatric: No sleep disturbances no depression  Endocrine: No muscle weakness and no muscle cramps  Hematologic/lymphatic: No swelling glands and no tendency for easy bruising    Medical History[1]     Surgical History[2]     Current Medications[3]     RX Allergies[4]     Social Connections: Not on file         65-year-old female well appearing in no acute distress. Alert and oriented ×3.  Skin intact bilateral lower extremities.   Normal tandem gait. Coordination and balance intact.  Bilateral lower extremity compartments supple.  2+ DP/PT pulses bilaterally.  Bilateral knee injection site is clear    L Inj/Asp: bilateral knee on 6/4/2025 8:53 AM  Indications: pain  Details: 21 G needle, superolateral approach  Medications (Right): 48 mg hylan 48 mg/6 mL  Medications (Left): 48 mg hylan 48 mg/6 mL           Procedure:  Right knee intra-articular Synvisc 1 injection is offered for therapeutic purposes.  Indication is knee pain and osteoarthritis.  Risks and benefits of the injection were discussed.  After questions were answered, consent was provided to proceed.  Under sterile conditions, the knee was injected through a superolateral patellar site with pre-loaded syringe of Synvisc 1 without complication.  The patient tolerated the injection well.  A dressing was applied.  Post injection instructions were given.  Lot #[id number]  Exp: 12/2027      Left knee intra-articular Synvisc  1 injection is offered for therapeutic purposes.  Indication is knee pain and osteoarthritis.  Risks and benefits of the injection were discussed.  After questions were answered, consent was provided to proceed.  Under sterile conditions, the knee was injected through a superolateral patellar site with pre-loaded syringe of Synvisc 1 without complication.  The patient tolerated the injection well.  A dressing was applied.  Post injection instructions were given.  Lot #[id number]  Exp: 12/2027    Diagnosis:  Bilateral knee osteoarthritis    Plan:    Hopefully patient will see good results with the series of injections.  We can repeat injections again in 6 months if needed.    This office note was dictated using Dragon voice to text software and was not proofread for spelling or grammatical errors          [1]   Past Medical History:  Diagnosis Date    Allergies     Body mass index (BMI) 36.0-36.9, adult 09/15/2018    BMI 36.0-36.9,adult    Chest pain, unspecified 11/09/2018    Chest pain syndrome    Class 2 severe obesity with serious comorbidity and body mass index (BMI) of 39.0 to 39.9 in adult 05/12/2023    Closed traumatic dislocation of joint of shoulder region 10/21/2014    Complex tear of medial meniscus of left knee as current injury 05/10/2023    Complex tear of meniscus of right knee as current injury 09/19/2024    Cough, unspecified 01/10/2014    Cough    Dislocation of shoulder region, left, initial encounter 05/10/2023    Elevated blood-pressure reading, without diagnosis of hypertension 07/21/2015    Elevated blood pressure reading without diagnosis of hypertension    Fracture of left shoulder girdle, part unspecified, initial encounter for closed fracture 02/28/2015    Fracture of bone of shoulder, left, closed, initial encounter    GERD (gastroesophageal reflux disease)     Gross hematuria 05/10/2023    Herpes zoster 05/10/2023    Male erectile dysfunction, unspecified 11/08/2018    Male erectile  disorder of organic origin    Obstructive sleep apnea (adult) (pediatric) 03/16/2019    Obstructive sleep apnea on CPAP    Orchitis, right 05/10/2023    Other abnormal glucose 09/27/2021    Hemoglobin A1c above reference range    Other general symptoms and signs 03/21/2016    Flu-like symptoms    Pain in left shoulder 12/01/2014    Left shoulder pain    Pain in right shoulder 12/01/2014    Right shoulder pain    Personal history of other diseases of male genital organs 03/31/2022    History of epididymitis    Personal history of other diseases of the digestive system 07/21/2015    History of constipation    Personal history of other diseases of the digestive system 10/21/2015    History of umbilical hernia    Personal history of other diseases of the nervous system and sense organs 02/28/2015    History of carpal tunnel syndrome    Personal history of other diseases of the nervous system and sense organs 08/19/2014    History of otitis media    Personal history of other diseases of the nervous system and sense organs 03/16/2019    History of sleep apnea    Personal history of other diseases of the respiratory system 09/13/2014    Personal history of asthma    Personal history of other diseases of the respiratory system 03/21/2016    History of acute sinusitis    Personal history of other endocrine, nutritional and metabolic disease 01/18/2018    History of obesity    Personal history of other specified conditions 07/30/2020    History of gross hematuria    Personal history of other specified conditions 02/16/2018    History of snoring    Personal history of other specified conditions 01/09/2014    History of headache    Right knee pain 05/10/2023    Umbilical hernia with obstruction, without gangrene 08/19/2015    Incarcerated umbilical hernia    Unspecified abdominal pain 07/21/2015    Abdominal cramping    Unspecified abdominal pain 09/27/2021    Abdominal wall pain    Unspecified abdominal pain 09/27/2021     Abdominal wall pain    Unspecified dacryocystitis of right lacrimal passage 02/13/2016    Dacrocystitis, right   [2]   Past Surgical History:  Procedure Laterality Date    COLONOSCOPY  12/13/2013    Complete Colonoscopy    CYSTOSCOPY  12/13/2013    Diagnostic Cystoscopy    ESOPHAGOGASTRODUODENOSCOPY  05/23/2017    Diagnostic Esophagogastroduodenoscopy    KNEE ARTHROSCOPY W/ MENISCAL REPAIR      KNEE ARTHROSCOPY W/ PARTIAL MEDIAL MENISCECTOMY Right 09/26/2024    RIGHT Knee Partial Medial Meniscectomy    KNEE SURGERY Left     OTHER SURGICAL HISTORY  12/13/2013    Neuroplasty With Transposition Of Ulnar Nerve - At Elbow    OTHER SURGICAL HISTORY  05/24/2021    Hernia Repair Inguinal Unilateral    UMBILICAL HERNIA REPAIR  11/17/2015    Umbilical Hernia Repair   [3]   Current Outpatient Medications:     cholecalciferol (Vitamin D-3) 25 MCG (1000 UT) tablet, Take 2 tablets (2,000 Units) by mouth once daily., Disp: , Rfl:     doxazosin (Cardura) 4 mg tablet, Take 1 tablet (4 mg) by mouth once daily at bedtime., Disp: 90 tablet, Rfl: 3    fexofenadine (Allegra) 180 mg tablet, Take 1 tablet (180 mg) by mouth once daily., Disp: , Rfl:     fluticasone (Flonase) 50 mcg/actuation nasal spray, Administer 2 sprays into each nostril once daily., Disp: , Rfl:     multivitamin tablet, Take 1 tablet by mouth once daily., Disp: , Rfl:     pantoprazole (ProtoNix) 40 mg EC tablet, Take 1 tablet (40 mg) by mouth once daily as needed., Disp: , Rfl:     polyethylene glycol (Glycolax) 17 gram/dose powder, Mix 17 g of powder and drink once daily as needed., Disp: , Rfl:     sildenafil (Viagra) 50 mg tablet, Take 1 tablet (50 mg) by mouth if needed for erectile dysfunction., Disp: 10 tablet, Rfl: 3  [4]   Allergies  Allergen Reactions    Cat Dander Unknown    House Dust Mite Other

## 2025-06-06 VITALS
WEIGHT: 271 LBS | HEIGHT: 71 IN | BODY MASS INDEX: 37.94 KG/M2 | SYSTOLIC BLOOD PRESSURE: 124 MMHG | HEART RATE: 71 BPM | OXYGEN SATURATION: 97 % | DIASTOLIC BLOOD PRESSURE: 84 MMHG

## 2025-06-06 PROBLEM — J45.909 REACTIVE AIRWAY DISEASE (HHS-HCC): Status: RESOLVED | Noted: 2023-05-10 | Resolved: 2025-06-06

## 2025-06-20 ENCOUNTER — APPOINTMENT (OUTPATIENT)
Dept: UROLOGY | Facility: HOSPITAL | Age: 65
End: 2025-06-20
Payer: COMMERCIAL

## 2025-07-16 DIAGNOSIS — R35.1 BENIGN PROSTATIC HYPERPLASIA WITH NOCTURIA: ICD-10-CM

## 2025-07-16 DIAGNOSIS — N40.1 BENIGN PROSTATIC HYPERPLASIA WITH NOCTURIA: ICD-10-CM

## 2025-07-16 DIAGNOSIS — I10 ESSENTIAL HYPERTENSION: ICD-10-CM

## 2025-07-16 RX ORDER — DOXAZOSIN 4 MG/1
4 TABLET ORAL NIGHTLY
Qty: 90 TABLET | Refills: 3 | Status: SHIPPED | OUTPATIENT
Start: 2025-07-16 | End: 2026-07-16

## 2025-09-05 DIAGNOSIS — Z12.5 SCREENING FOR PROSTATE CANCER: ICD-10-CM

## 2025-09-06 LAB — PSA SERPL-MCNC: 3.23 NG/ML

## 2025-09-12 ENCOUNTER — APPOINTMENT (OUTPATIENT)
Dept: UROLOGY | Facility: HOSPITAL | Age: 65
End: 2025-09-12
Payer: COMMERCIAL

## 2025-11-26 ENCOUNTER — APPOINTMENT (OUTPATIENT)
Dept: PRIMARY CARE | Facility: CLINIC | Age: 65
End: 2025-11-26
Payer: COMMERCIAL

## 2025-12-26 ENCOUNTER — APPOINTMENT (OUTPATIENT)
Dept: UROLOGY | Facility: HOSPITAL | Age: 65
End: 2025-12-26
Payer: COMMERCIAL

## 2026-06-02 ENCOUNTER — APPOINTMENT (OUTPATIENT)
Dept: PRIMARY CARE | Facility: CLINIC | Age: 66
End: 2026-06-02
Payer: COMMERCIAL

## (undated) DEVICE — CUFF, TOURNIQUET, 30 X 4, DUAL PORT/SNGL BLADDER, DISP, LF

## (undated) DEVICE — DRAPE, TOWEL, STERI DRAPE, 17 X 11 IN, PLASTIC, STERILE

## (undated) DEVICE — APPLICATOR, CHLORAPREP, W/ORANGE TINT, 26ML

## (undated) DEVICE — SUTURE, ETHILON, 4-0, 18, PS2, BLACK

## (undated) DEVICE — GLOVE, SURGICAL, PROTEXIS PI W/NEU-THERA, 8.5, PF, LF

## (undated) DEVICE — ADAPTER, Y TUBING STERILE

## (undated) DEVICE — BANDAGE, ELASTIC, ACE, SELF-CLOSURE, 6 IN X 5 YD, STERILE

## (undated) DEVICE — DRAPE, SHEET, FAN FOLDED, HALF, 44 X 58 IN, DISPOSABLE, LF, STERILE

## (undated) DEVICE — Device

## (undated) DEVICE — TUBING, NFLOW, FMS VUE, SNGL USE, DISP, LF

## (undated) DEVICE — GLOVE, SURGICAL, PROTEXIS PI W/NEU-THERA, 7.5, PF, LF

## (undated) DEVICE — MAT, FLOOR, SURGISAFE, FLUID CONTROL, 46X40

## (undated) DEVICE — BLADE, ORBIT, SYNOVATOR, EP-1, 4.5MM

## (undated) DEVICE — GLOVE, SURGICAL, PROTEXIS PI MICRO, 8.0, PF, LF

## (undated) DEVICE — TOWEL, SURGICAL, NEURO, O/R, 16 X 26, BLUE, STERILE

## (undated) DEVICE — TUBING, OUTFLOW, DRAIN PIPE, W/ONE WAY VALVE (FMS VUE)

## (undated) DEVICE — BANDAGE, ESMARK, 6 IN X 9 FT, STERILE

## (undated) DEVICE — GLOVE, SURGICAL, PROTEXIS PI MICRO, 7.0, PF, LF